# Patient Record
Sex: MALE | Race: WHITE | Employment: FULL TIME | URBAN - NONMETROPOLITAN AREA
[De-identification: names, ages, dates, MRNs, and addresses within clinical notes are randomized per-mention and may not be internally consistent; named-entity substitution may affect disease eponyms.]

---

## 2020-01-15 ENCOUNTER — HOSPITAL ENCOUNTER (EMERGENCY)
Age: 55
Discharge: HOME | End: 2020-01-15
Payer: COMMERCIAL

## 2020-01-15 VITALS
BODY MASS INDEX: 38.51 KG/M2 | DIASTOLIC BLOOD PRESSURE: 92 MMHG | RESPIRATION RATE: 18 BRPM | SYSTOLIC BLOOD PRESSURE: 133 MMHG | WEIGHT: 260 LBS | TEMPERATURE: 98.2 F | HEART RATE: 88 BPM | HEIGHT: 69 IN | OXYGEN SATURATION: 96 %

## 2020-01-15 PROCEDURE — 99213 OFFICE O/P EST LOW 20 MIN: CPT | Performed by: NURSE PRACTITIONER

## 2020-01-15 PROCEDURE — 99202 OFFICE O/P NEW SF 15 MIN: CPT

## 2020-01-15 RX ORDER — COVID-19 ANTIGEN TEST
1 KIT MISCELLANEOUS
COMMUNITY
End: 2021-09-20

## 2020-01-15 RX ORDER — PREDNISONE 50 MG/1
50 TABLET ORAL DAILY
Qty: 5 TABLET | Refills: 0 | Status: SHIPPED | OUTPATIENT
Start: 2020-01-15 | End: 2020-01-20

## 2020-01-15 ASSESSMENT — PAIN DESCRIPTION - ONSET: ONSET: GRADUAL

## 2020-01-15 ASSESSMENT — ENCOUNTER SYMPTOMS
SHORTNESS OF BREATH: 0
NAUSEA: 0
VOMITING: 0

## 2020-01-15 ASSESSMENT — PAIN SCALES - GENERAL: PAINLEVEL_OUTOF10: 9

## 2020-01-15 ASSESSMENT — PAIN - FUNCTIONAL ASSESSMENT: PAIN_FUNCTIONAL_ASSESSMENT: PREVENTS OR INTERFERES WITH ALL ACTIVE AND SOME PASSIVE ACTIVITIES

## 2020-01-15 ASSESSMENT — PAIN DESCRIPTION - ORIENTATION: ORIENTATION: RIGHT

## 2020-01-15 ASSESSMENT — PAIN DESCRIPTION - FREQUENCY: FREQUENCY: INTERMITTENT

## 2020-01-15 ASSESSMENT — PAIN DESCRIPTION - LOCATION: LOCATION: FOOT

## 2020-01-15 ASSESSMENT — PAIN DESCRIPTION - PROGRESSION: CLINICAL_PROGRESSION: RAPIDLY WORSENING

## 2020-01-15 ASSESSMENT — PAIN DESCRIPTION - DESCRIPTORS: DESCRIPTORS: THROBBING

## 2020-01-15 NOTE — ED PROVIDER NOTES
[address]  Urgent Care Encounter       CHIEF COMPLAINT       Chief Complaint   Patient presents with    Foot Pain     right foot x 2 days        Nurses Notes reviewed and I agree except as noted in the HPI. HISTORY OF PRESENT ILLNESS   Donya Rivero is a 48 y.o. male who presents with complaints of pain, redness and swelling to the right great toe, onset 2 days ago. Patient states pain is become progressively worse since onset. Patient denies a history of gout but has a history of kidney stones and an elevated uric acid level approximately 6 years ago. He denies alcohol use. No fevers, chills, nausea or vomiting. The history is provided by the patient. REVIEW OF SYSTEMS     Review of Systems   Constitutional: Negative for chills and fever. Respiratory: Negative for shortness of breath. Cardiovascular: Negative for chest pain. Gastrointestinal: Negative for nausea and vomiting. Musculoskeletal:        Right great toe swelling, redness and pain   Neurological: Negative for headaches. PAST MEDICAL HISTORY         Diagnosis Date    History of kidney stones 2014    Dr Rogelio Harris [facility]     Kidney stone        SURGICALHISTORY     Patient  has a past surgical history that includes Colonoscopy (4/12); Tonsillectomy (as a child ); Arm Surgery (Left, as a child ); cyst removal (Left, 12/16/2013); and Skin cancer excision (6-2014). CURRENT MEDICATIONS       Previous Medications    IBUPROFEN (ADVIL;MOTRIN) 200 MG TABLET    Take 200 mg by mouth every 6 hours as needed for Pain. NAPROXEN SODIUM (ALEVE) 220 MG CAPS    Take 1 tablet by mouth       ALLERGIES     Patient is has No Known Allergies. Patients   There is no immunization history on file for this patient. FAMILY HISTORY     Patient's family history includes Heart Disease in his father; Heart Surgery in his father; Hypertension in his father and mother.     SOCIAL HISTORY     Patient  reports that he has never smoked. He has never used smokeless tobacco. He reports that he does not drink alcohol or use drugs. PHYSICAL EXAM     ED TRIAGE VITALS  BP: (!) 133/92, Temp: 98.2 °F (36.8 °C), Pulse: 88, Resp: 18, SpO2: 96 %,Estimated body mass index is 38.4 kg/m² as calculated from the following:    Height as of this encounter: 5' 9\" (1.753 m). Weight as of this encounter: 260 lb (117.9 kg). ,No LMP for male patient. Physical Exam  Vitals signs and nursing note reviewed. Constitutional:       General: He is not in acute distress. Appearance: He is well-developed. HENT:      Head: Normocephalic and atraumatic. Pulmonary:      Effort: Pulmonary effort is normal. No respiratory distress. Musculoskeletal:      Right foot: Decreased range of motion. Tenderness and swelling present. Feet:       Comments: Tenderness, swelling, redness and warmth noted to the first MTP joint on the right foot. Skin:     General: Skin is warm and dry. Neurological:      Mental Status: He is alert and oriented to person, place, and time. Psychiatric:         Speech: Speech normal.         Behavior: Behavior normal. Behavior is cooperative. DIAGNOSTIC RESULTS     Labs:No results found for this visit on 01/15/20. IMAGING:    No orders to display         EKG:      URGENT CARE COURSE:     Vitals:    01/15/20 1228   BP: (!) 133/92   Pulse: 88   Resp: 18   Temp: 98.2 °F (36.8 °C)   TempSrc: Temporal   SpO2: 96%   Weight: 260 lb (117.9 kg)   Height: 5' 9\" (1.753 m)       Medications - No data to display         PROCEDURES:  None    FINAL IMPRESSION      1. Acute idiopathic gout involving toe of right foot          DISPOSITION/ PLAN     Patient presents with acute gout of the first MTP joint on the right foot. Treat with prednisone burst.  Ice and limited activity recommended. Follow-up with family doctor or podiatrist if not improved with treatment.   Further instructions were outlined verbally and in the patient's discharge instructions. All the patient's questions were answered. The patient/parent agreed with the plan and was discharged from the [facility] in good condition. PATIENT REFERRED TO:  Lay Ornelas MD  [address] [address]      DISCHARGE MEDICATIONS:  New Prescriptions    PREDNISONE (DELTASONE) 50 MG TABLET    Take 1 tablet by mouth daily for 5 days       Discontinued Medications    GEMFIBROZIL (LOPID) 600 MG TABLET    Take 1 tablet by mouth 2 times daily for 30 days. HYDROCODONE-ACETAMINOPHEN (NORCO) 5-325 MG PER TABLET    Take 1 tablet by mouth every 6 hours as needed for Pain. ONDANSETRON (ZOFRAN ODT) 4 MG DISINTEGRATING TABLET    Take 1 tablet by mouth every 8 hours as needed for Nausea. TAMSULOSIN (FLOMAX) 0.4 MG CAPSULE    Take 1 capsule by mouth daily for 5 doses.        Current Discharge Medication List          CALEB Arreola CNP    (Please note that portions of this note were completed with a voice recognition program. Efforts were made to edit the dictations but occasionally words are mis-transcribed.)         CALEB Arreola CNP  01/15/20 7863

## 2020-01-17 ENCOUNTER — OFFICE VISIT (OUTPATIENT)
Dept: FAMILY MEDICINE CLINIC | Age: 55
End: 2020-01-17
Payer: COMMERCIAL

## 2020-01-17 VITALS
SYSTOLIC BLOOD PRESSURE: 166 MMHG | BODY MASS INDEX: 42.59 KG/M2 | HEART RATE: 96 BPM | DIASTOLIC BLOOD PRESSURE: 88 MMHG | WEIGHT: 288.38 LBS | RESPIRATION RATE: 20 BRPM | TEMPERATURE: 97.3 F

## 2020-01-17 PROCEDURE — G8484 FLU IMMUNIZE NO ADMIN: HCPCS | Performed by: NURSE PRACTITIONER

## 2020-01-17 PROCEDURE — 1036F TOBACCO NON-USER: CPT | Performed by: NURSE PRACTITIONER

## 2020-01-17 PROCEDURE — G8427 DOCREV CUR MEDS BY ELIG CLIN: HCPCS | Performed by: NURSE PRACTITIONER

## 2020-01-17 PROCEDURE — 99203 OFFICE O/P NEW LOW 30 MIN: CPT | Performed by: NURSE PRACTITIONER

## 2020-01-17 PROCEDURE — G8417 CALC BMI ABV UP PARAM F/U: HCPCS | Performed by: NURSE PRACTITIONER

## 2020-01-17 PROCEDURE — 3017F COLORECTAL CA SCREEN DOC REV: CPT | Performed by: NURSE PRACTITIONER

## 2020-01-17 ASSESSMENT — ENCOUNTER SYMPTOMS
VOMITING: 0
EYE PAIN: 0
BLOOD IN STOOL: 0
NAUSEA: 0
ABDOMINAL PAIN: 0
COUGH: 0
DIARRHEA: 0
SHORTNESS OF BREATH: 0
WHEEZING: 0
COLOR CHANGE: 0
BACK PAIN: 0

## 2020-01-17 NOTE — PATIENT INSTRUCTIONS
Patient Education        Gout: Care Instructions  Your Care Instructions    Gout is a form of arthritis caused by a buildup of uric acid crystals in a joint. It causes sudden attacks of pain, swelling, redness, and stiffness, usually in one joint, especially the big toe. Gout usually comes on without a cause. But it can be brought on by drinking alcohol (especially beer) or eating seafood and red meat. Taking certain medicines, such as diuretics or aspirin, also can bring on an attack of gout. Taking your medicines as prescribed and following up with your doctor regularly can help you avoid gout attacks in the future. Follow-up care is a key part of your treatment and safety. Be sure to make and go to all appointments, and call your doctor if you are having problems. It's also a good idea to know your test results and keep a list of the medicines you take. How can you care for yourself at home? · If the joint is swollen, put ice or a cold pack on the area for 10 to 20 minutes at a time. Put a thin cloth between the ice and your skin. · Prop up the sore limb on a pillow when you ice it or anytime you sit or lie down during the next 3 days. Try to keep it above the level of your heart. This will help reduce swelling. · Rest sore joints. Avoid activities that put weight or strain on the joints for a few days. Take short rest breaks from your regular activities during the day. · Take your medicines exactly as prescribed. Call your doctor if you think you are having a problem with your medicine. · Take pain medicines exactly as directed. ? If the doctor gave you a prescription medicine for pain, take it as prescribed. ? If you are not taking a prescription pain medicine, ask your doctor if you can take an over-the-counter medicine. · Eat less seafood and red meat. · Check with your doctor before drinking alcohol. · Losing weight, if you are overweight, may help reduce attacks of gout.  But do not go on a \"crash diet. \" Losing a lot of weight in a short amount of time can cause a gout attack. When should you call for help? Call your doctor now or seek immediate medical care if:    · You have a fever.     · The joint is so painful you cannot use it.     · You have sudden, unexplained swelling, redness, warmth, or severe pain in one or more joints.    Watch closely for changes in your health, and be sure to contact your doctor if:    · You have joint pain.     · Your symptoms get worse or are not improving after 2 or 3 days. Where can you learn more? Go to https://[email or web address].[email or web address] IQeweb.[email or web address] org and sign in to your Mastodon C account. Enter E851 in the SurIDx box to learn more about \"Gout: Care Instructions. \"     If you do not have an account, please click on the \"Sign Up Now\" link. Current as of: April 1, 2019  Content Version: 12.3  © 5625-5756 [employer]. Care instructions adapted under license by [facility] ([facility]). If you have questions about a medical condition or this instruction, always ask your healthcare professional. [place] [address] any warranty or liability for your use of this information. Patient Education        Cholesterol and Triglycerides Tests: About These Tests  What are they? Cholesterol and triglycerides tests measure the amount of fats in your blood. These fats have both \"good\" (HDL) and \"bad\" (LDL) cholesterol. Why are these tests done? These tests are done to help find out your risk of a heart attack and stroke. They can help your doctor find out how well medicine is working for some health problems. How can you prepare for these tests? · Your doctor may tell you to fast before your tests. This means that you do not eat or drink anything except water for 9 to 12 hours before the tests. In most cases, you can take your medicines with water the morning of the test.  · Do not eat high-fat foods the night before the tests.   · Do not drink alcohol or do intense exercise the night before the tests. · Be sure to tell your doctor about all the over-the-counter and prescription medicines and herbs or other supplements you take. They can affect the results of these tests. What happens during these tests? A health professional takes a sample of your blood. What else should you know about these tests? Your cholesterol levels can help your doctor find out your risk for having a heart attack or stroke. But it's not just about your cholesterol. Your doctor uses your cholesterol levels plus other things to calculate your risk. These include:  · Your blood pressure. · Whether or not you have diabetes. · Your age, sex, and race. · Whether or not you smoke. You and your doctor can talk about whether you need to lower your risk and what treatment is best for you. Where can you learn more? Go to https://Coterapeperlaeb.ClearDATA. org and sign in to your Quack account. Enter K857 in the Clarivoy box to learn more about \"Cholesterol and Triglycerides Tests: About These Tests. \"     If you do not have an account, please click on the \"Sign Up Now\" link. Current as of: April 9, 2019  Content Version: 12.3  © 8656-5614 [employer]. Care instructions adapted under license by [facility] ([facility]). If you have questions about a medical condition or this instruction, always ask your healthcare professional. [address] any warranty or liability for your use of this information.

## 2020-01-17 NOTE — PROGRESS NOTES
eye: No discharge. Left eye: No discharge. Conjunctiva/sclera: Conjunctivae normal.   Neck:      Musculoskeletal: Full passive range of motion without pain and neck supple. Vascular: No carotid bruit. Trachea: No tracheal deviation. Cardiovascular:      Rate and Rhythm: Normal rate and regular rhythm. Heart sounds: Normal heart sounds. No murmur. Pulmonary:      Effort: Pulmonary effort is normal. No respiratory distress. Breath sounds: Normal breath sounds. Abdominal:      General: Bowel sounds are normal.      Palpations: Abdomen is soft. Tenderness: There is no tenderness. Musculoskeletal:      Right foot: Decreased range of motion. Tenderness and swelling present. Feet:    Feet:      Right foot:      Skin integrity: Erythema and warmth present. Toenail Condition: Right toenails are normal.   Lymphadenopathy:      Head:      Right side of head: No submental, submandibular, tonsillar, preauricular, posterior auricular or occipital adenopathy. Left side of head: No submental, submandibular, tonsillar, preauricular, posterior auricular or occipital adenopathy. Cervical: No cervical adenopathy. Skin:     General: Skin is warm and dry. Findings: No rash. Neurological:      General: No focal deficit present. Mental Status: He is alert and oriented to person, place, and time. Coordination: Coordination normal.   Psychiatric:         Behavior: Behavior normal.         Thought Content: Thought content normal.         Judgment: Judgment normal.         ASSESSMENT/PLAN:  1. Lipid screening  - Lipid Panel; Future    2. IFG (impaired fasting glucose)  - Hemoglobin A1C; Future  - Comprehensive Metabolic Panel; Future  - TSH without Reflex; Future  - T4, Free; Future    3. Acute gout of right foot, unspecified cause  - CBC Auto Differential; Future  - Comprehensive Metabolic Panel; Future    4.  Screening PSA (prostate specific antigen)  - Psa

## 2020-01-23 LAB
ABSOLUTE BASO #: 0.1 X10E9/L (ref 0–0.9)
ABSOLUTE EOS #: 0.2 X10E9/L (ref 0–0.4)
ABSOLUTE LYMPH #: 2.2 X10E9/L (ref 1–3.5)
ABSOLUTE MONO #: 0.8 X10E9/L (ref 0–0.9)
ABSOLUTE NEUT #: 6.9 X10E9/L (ref 1.5–6.6)
ALBUMIN SERPL-MCNC: 4.3 G/DL (ref 3.2–5.3)
ALK PHOSPHATASE: 110 U/L (ref 39–130)
ALT SERPL-CCNC: 15 U/L (ref 0–40)
ANION GAP SERPL CALCULATED.3IONS-SCNC: 12 MMOL/L (ref 4–12)
AST SERPL-CCNC: 16 U/L (ref 0–41)
AVERAGE GLUCOSE: 120
AVERAGE GLUCOSE: 120 MG/DL
BASOPHILS RELATIVE PERCENT: 0.7 %
BILIRUB SERPL-MCNC: 0.7 MG/DL (ref 0.3–1.2)
BUN BLDV-MCNC: 13 MG/DL (ref 5–23)
CALCIUM SERPL-MCNC: 9.2 MG/DL (ref 8.5–10.5)
CHLORIDE BLD-SCNC: 104 MMOL/L (ref 98–109)
CHOLESTEROL/HDL RATIO: 4.1 (ref 1–5)
CHOLESTEROL: 146 MG/DL (ref 150–200)
CO2: 27 MMOL/L (ref 22–32)
CREAT SERPL-MCNC: 1.08 MG/DL (ref 0.6–1.3)
EGFR AFRICAN AMERICAN: >60 ML/MIN/1.73SQ.M
EGFR IF NONAFRICAN AMERICAN: >60 ML/MIN/1.73SQ.M
EOSINOPHILS RELATIVE PERCENT: 1.7 %
GLUCOSE: 97 MG/DL (ref 65–99)
HBA1C MFR BLD: 5.8 %
HBA1C MFR BLD: 5.8 % (ref 4.4–6.4)
HCT VFR BLD CALC: 48.4 % (ref 39–49)
HDLC SERPL-MCNC: 36 MG/DL
HEMOGLOBIN: 16.2 G/DL (ref 13.1–17.3)
LDL CHOLESTEROL CALCULATED: 78 MG/DL
LDL CHOLESTEROL DIRECT: 78 MG/DL
LDL/HDL RATIO: 2.2
LYMPHOCYTE %: 21.7 %
MCH RBC QN AUTO: 29.2 PG (ref 27–35)
MCHC RBC AUTO-ENTMCNC: 33.4 G/DL (ref 32–36)
MCV RBC AUTO: 88 FL (ref 81–101)
MONOCYTES # BLD: 8.2 %
NEUTROPHILS RELATIVE PERCENT: 67.7 %
PDW BLD-RTO: 13.6 % (ref 11.4–14.3)
PLATELETS: 280 X10E9/L (ref 150–450)
PMV BLD AUTO: 9.6 FL (ref 7–12)
POTASSIUM SERPL-SCNC: 3.6 MMOL/L (ref 3.5–5)
PSA, ULTRASENSITIVE: 0.32 NG/ML (ref 0–4)
RBC: 5.53 X10E12/L (ref 4.25–5.65)
SODIUM BLD-SCNC: 143 MMOL/L (ref 134–146)
T4 FREE: 1.09 NG/DL (ref 0.61–1.6)
TOTAL PROTEIN: 6.8 G/DL (ref 6–8)
TRIGL SERPL-MCNC: 160 MG/DL (ref 27–150)
TSH SERPL DL<=0.05 MIU/L-ACNC: 2.44 UIU/ML (ref 0.49–4.67)
VLDLC SERPL CALC-MCNC: 32 MG/DL (ref 0–30)
WBC: 10.2 X10E9/L (ref 4.8–10.8)

## 2020-02-14 ENCOUNTER — OFFICE VISIT (OUTPATIENT)
Dept: FAMILY MEDICINE CLINIC | Age: 55
End: 2020-02-14
Payer: COMMERCIAL

## 2020-02-14 VITALS
SYSTOLIC BLOOD PRESSURE: 136 MMHG | DIASTOLIC BLOOD PRESSURE: 86 MMHG | WEIGHT: 287 LBS | RESPIRATION RATE: 16 BRPM | HEART RATE: 80 BPM | BODY MASS INDEX: 42.38 KG/M2

## 2020-02-14 PROBLEM — Z87.39 H/O: GOUT: Status: ACTIVE | Noted: 2020-02-14

## 2020-02-14 PROCEDURE — G8484 FLU IMMUNIZE NO ADMIN: HCPCS | Performed by: NURSE PRACTITIONER

## 2020-02-14 PROCEDURE — 99396 PREV VISIT EST AGE 40-64: CPT | Performed by: NURSE PRACTITIONER

## 2020-02-14 RX ORDER — COLCHICINE 0.6 MG/1
TABLET, FILM COATED ORAL
COMMUNITY
Start: 2020-01-22 | End: 2021-09-20 | Stop reason: ALTCHOICE

## 2020-02-14 ASSESSMENT — ENCOUNTER SYMPTOMS
COLOR CHANGE: 0
COUGH: 0
SINUS PAIN: 0
TROUBLE SWALLOWING: 0
VOMITING: 0
EYE PAIN: 0
SHORTNESS OF BREATH: 0
ABDOMINAL PAIN: 0
BACK PAIN: 0
SORE THROAT: 0
WHEEZING: 0
FACIAL SWELLING: 0
BLOOD IN STOOL: 0
NAUSEA: 0
DIARRHEA: 0

## 2020-02-14 NOTE — PROGRESS NOTES
[address] [facility] [address]  Dept: [phone]  Dept Fax: [phone]  Loc: [phone]     2020     Corin Martinez (:  1965) is a 48 y.o. male, here for evaluation of the following medical concerns:    Chief Complaint   Patient presents with    Follow-up   [address]     labs in lab tab        HPI  Pt presents to the office today for annual exam.  He denies any chronic health problems. He was seen a few weeks ago for acute gout attack and was sent to the podiatrist, he had an injection and was given oral colcrys with full relief of symptoms. He has a follow up with podiatry next week. Pt is active, his BMI is 42.38. He was told he had high cholesterol in the past, but has not had them done in a few years. He is a non smoker. Colonoscopy done a few years ago and he was given a 10 year window at that time.       Component      Latest Ref Rng & Units 2020   WBC      4.8 - 10.8 X10E9/L 10.2   RBC      4.25 - 5.65 X10E12/L 5.53   Hemoglobin Quant      13.1 - 17.3 g/dL 16.2   Hematocrit      39 - 49 % 48.4   MCV      81 - 101 fL 88   MCH      27 - 35 pg 29.2   MCHC      32 - 36 g/dL 33.4   RDW      11.4 - 14.3 % 13.6   Platelet Count      499 - 450 X10E9/L 280   MPV      7 - 12 fL 9.6   Neutrophils %      % 67.7   Absolute Neut #      1.5 - 6.6 X10E9/L 6.9 (H)   Lymphocyte %      % 21.7   Absolute Lymph #      1.0 - 3.5 X10E9/L 2.2   Monocytes      % 8.2   Absolute Mono #      0 - 0.9 X10E9/L 0.8   Eosinophils %      % 1.7   Absolute Eos #      0.0 - 0.4 X10E9/L 0.2   Basophils %      % 0.7   Basophils Absolute      0 - 0.9 X10E9/L 0.1   Glucose      65 - 99 mg/dL 97   BUN      5 - 23 mg/dL 13   Creatinine      0.60 - 1.30 mg/dL 1.08   eGFR African American      >59 ml/min/1.73sq.m >60   EGFR IF NonAfrican American      >59 ml/min/1.73sq.m >60   Calcium      8.5 - 10.5 mg/dL 9.2   Sodium      134 - 146 Bowel sounds are normal.      Palpations: Abdomen is soft. Tenderness: There is no abdominal tenderness. Musculoskeletal: Normal range of motion. Lymphadenopathy:      Head:      Right side of head: No submental, submandibular, tonsillar, preauricular, posterior auricular or occipital adenopathy. Left side of head: No submental, submandibular, tonsillar, preauricular, posterior auricular or occipital adenopathy. Cervical: No cervical adenopathy. Skin:     General: Skin is warm and dry. Findings: No rash. Neurological:      General: No focal deficit present. Mental Status: He is alert and oriented to person, place, and time. Coordination: Coordination normal.   Psychiatric:         Mood and Affect: Mood normal.         Behavior: Behavior normal.         Thought Content: Thought content normal.         Judgment: Judgment normal.         ASSESSMENT/PLAN:  1. Annual physical exam    2. Lipid screening  - Lipid Panel; Future    3. Screening PSA (prostate specific antigen)  - Psa screening; Future    4. Screening for diabetes mellitus (DM)  - Glucose, Fasting; Future    5. H/O: gout    - UTD on Colonoscopy- will track down report from Dr Tristan Petersen and review follow up   - Repeat labs in 1 year as ordered  - Work on diet and exercise for optimal cardiovascular health and to help with BP.  - Call office with any questions or concerns, or if symptoms are getting worse or changing      Return in about 1 year (around 2/14/2021) for Wellness/Physical.    Patient given educational materials - see patient instructions. Discussed use, benefit, and side effects of prescribed medications. All patient questions answered. Pt voiced understanding. Reviewed health maintenance. An electronic signature was used to authenticate this note.     --CALEB Porras - CNP on 2/14/2020 at 8:37 AM

## 2020-11-12 ENCOUNTER — TELEPHONE (OUTPATIENT)
Dept: FAMILY MEDICINE CLINIC | Age: 55
End: 2020-11-12

## 2020-11-12 ENCOUNTER — HOSPITAL ENCOUNTER (OUTPATIENT)
Age: 55
Setting detail: SPECIMEN
Discharge: HOME | End: 2020-11-12
Payer: COMMERCIAL

## 2020-11-12 PROCEDURE — U0003 INFECTIOUS AGENT DETECTION BY NUCLEIC ACID (DNA OR RNA); SEVERE ACUTE RESPIRATORY SYNDROME CORONAVIRUS 2 (SARS-COV-2) (CORONAVIRUS DISEASE [COVID-19]), AMPLIFIED PROBE TECHNIQUE, MAKING USE OF HIGH THROUGHPUT TECHNOLOGIES AS DESCRIBED BY CMS-2020-01-R: HCPCS

## 2020-11-12 NOTE — TELEPHONE ENCOUNTER
Co-worker exposed and tested positive, he needs a test done for work. Had symptoms last week, but none for 48 hours.

## 2020-11-13 LAB — SARS-COV-2: DETECTED

## 2020-11-17 ENCOUNTER — TELEPHONE (OUTPATIENT)
Dept: FAMILY MEDICINE CLINIC | Age: 55
End: 2020-11-17

## 2020-11-17 RX ORDER — DEXTROMETHORPHAN HYDROBROMIDE AND PROMETHAZINE HYDROCHLORIDE 15; 6.25 MG/5ML; MG/5ML
5 SYRUP ORAL 4 TIMES DAILY PRN
Qty: 120 ML | Refills: 0 | Status: SHIPPED | OUTPATIENT
Start: 2020-11-17 | End: 2020-11-20 | Stop reason: SDUPTHER

## 2020-11-17 NOTE — TELEPHONE ENCOUNTER
Wife calling on behalf of patient. States that he is having continued issues with his cough due to COVID. Can hardly speak without having \"coughing fit\". Has tried Robitussin, Nyquil, and Mucinex with no benefit. No wheezing. Wife is requesting a steroid if appropriate or prescription cough medication. NKDA, pharmacy info entered.

## 2020-11-20 ENCOUNTER — TELEPHONE (OUTPATIENT)
Dept: FAMILY MEDICINE CLINIC | Age: 55
End: 2020-11-20

## 2020-11-20 RX ORDER — DEXTROMETHORPHAN HYDROBROMIDE AND PROMETHAZINE HYDROCHLORIDE 15; 6.25 MG/5ML; MG/5ML
5 SYRUP ORAL 4 TIMES DAILY PRN
Qty: 120 ML | Refills: 0 | Status: SHIPPED | OUTPATIENT
Start: 2020-11-20 | End: 2021-09-20 | Stop reason: ALTCHOICE

## 2020-11-20 NOTE — TELEPHONE ENCOUNTER
Patient's wife, Isabelle Rivera, calling to see if you would consider giving patient ATB or steroid at this time. He is COVID positive and was recently given rx for promethazine for his cough. This has been beneficial but wife wants to know if there is something more you could do as it just seems like he isn't getting better. Denies acute SOB, no wheezing, etc. Just has a lot of nasal congestion (using Sudafed with some benefit) and coughing spells when eating, drinking, talking. Very fatigued. Please advise. Pharmacy info entered.

## 2020-11-20 NOTE — TELEPHONE ENCOUNTER
The most recent study document no role for/ benefit from Steroids unless pt requires O2 at this time. The other consideration would be if pt actively wheezing at this time. We also don't like to start Steroids if we don't have to due to the immunosuppressing nature of steroids. There is also no role for Zithromax in treatment of COVID unless pt has secondary bacterial infection. OK for VV with TS/ WS today to discuss symptoms/ need for further treatement.   ES

## 2021-09-08 ENCOUNTER — TELEPHONE (OUTPATIENT)
Dept: FAMILY MEDICINE CLINIC | Age: 56
End: 2021-09-08

## 2021-09-08 DIAGNOSIS — Z12.5 ENCOUNTER FOR SCREENING FOR MALIGNANT NEOPLASM OF PROSTATE: ICD-10-CM

## 2021-09-08 DIAGNOSIS — Z00.00 LABORATORY EXAMINATION ORDERED AS PART OF A ROUTINE GENERAL MEDICAL EXAMINATION: Primary | ICD-10-CM

## 2021-09-08 DIAGNOSIS — R73.01 IFG (IMPAIRED FASTING GLUCOSE): ICD-10-CM

## 2021-09-08 DIAGNOSIS — Z87.39 H/O: GOUT: ICD-10-CM

## 2021-09-08 DIAGNOSIS — E78.5 HYPERLIPIDEMIA, UNSPECIFIED HYPERLIPIDEMIA TYPE: ICD-10-CM

## 2021-09-08 NOTE — TELEPHONE ENCOUNTER
Check FLP, CMP, free T4/TSH, CBC, uric acid, and PSA at this time diagnoses: Labs associated with general medical exam, gout, low HDL, screening PSA, screening for diabetes.   ES

## 2021-09-10 LAB
ABSOLUTE BASO #: 0.1 X10E9/L (ref 0–0.2)
ABSOLUTE EOS #: 0.1 X10E9/L (ref 0–0.4)
ABSOLUTE LYMPH #: 1.8 X10E9/L (ref 1–3.5)
ABSOLUTE MONO #: 0.8 X10E9/L (ref 0–0.9)
ABSOLUTE NEUT #: 5.8 X10E9/L (ref 1.5–6.6)
ALBUMIN SERPL-MCNC: 4.2 G/DL (ref 3.2–5.3)
ALK PHOSPHATASE: 107 U/L (ref 39–130)
ALT SERPL-CCNC: 17 U/L (ref 0–40)
ANION GAP SERPL CALCULATED.3IONS-SCNC: 9 MMOL/L (ref 5–15)
AST SERPL-CCNC: 18 U/L (ref 0–41)
BASOPHILS RELATIVE PERCENT: 0.7 %
BILIRUB SERPL-MCNC: 0.8 MG/DL (ref 0.3–1.2)
BUN BLDV-MCNC: 9 MG/DL (ref 5–23)
CALCIUM SERPL-MCNC: 9.2 MG/DL (ref 8.5–10.5)
CHLORIDE BLD-SCNC: 105 MMOL/L (ref 98–109)
CHOLESTEROL/HDL RATIO: 4.3 (ref 1–5)
CHOLESTEROL: 145 MG/DL (ref 150–200)
CO2: 28 MMOL/L (ref 22–32)
CREAT SERPL-MCNC: 1.14 MG/DL (ref 0.6–1.3)
EGFR AFRICAN AMERICAN: >60 ML/MIN/1.73SQ.M
EGFR IF NONAFRICAN AMERICAN: >60 ML/MIN/1.73SQ.M
EOSINOPHILS RELATIVE PERCENT: 1.1 %
GLUCOSE: 97 MG/DL (ref 65–99)
HCT VFR BLD CALC: 45.5 % (ref 39–49)
HDLC SERPL-MCNC: 34 MG/DL
HEMOGLOBIN: 15.2 G/DL (ref 13–17)
LDL CHOLESTEROL CALCULATED: 71 MG/DL
LDL/HDL RATIO: 2.1
LYMPHOCYTE %: 21.4 %
MCH RBC QN AUTO: 28.7 PG (ref 27–34)
MCHC RBC AUTO-ENTMCNC: 33.4 G/DL (ref 32–36)
MCV RBC AUTO: 86 FL (ref 80–100)
MONOCYTES # BLD: 8.9 %
NEUTROPHILS RELATIVE PERCENT: 67.9 %
PDW BLD-RTO: 13.3 % (ref 11.5–15)
PLATELETS: 269 X10E9/L (ref 150–450)
PMV BLD AUTO: 10.1 FL (ref 7–12)
POTASSIUM SERPL-SCNC: 3.8 MMOL/L (ref 3.5–5)
PSA, ULTRASENSITIVE: 0.31 NG/ML (ref 0–4)
RBC: 5.3 X10E12/L (ref 4.1–5.7)
SODIUM BLD-SCNC: 142 MMOL/L (ref 134–146)
T4 FREE: 0.73 NG/DL (ref 0.61–1.6)
TOTAL PROTEIN: 6.9 G/DL (ref 6–8)
TRIGL SERPL-MCNC: 199 MG/DL (ref 27–150)
TSH SERPL DL<=0.05 MIU/L-ACNC: 1.43 UIU/ML (ref 0.49–4.67)
URIC ACID: 8.6 MG/DL (ref 2.6–7.2)
VLDLC SERPL CALC-MCNC: 40 MG/DL (ref 0–30)
WBC: 8.6 X10E9/L (ref 4–11)

## 2021-09-18 NOTE — PROGRESS NOTES
[facility]  [facility]  Dept: [phone]  Dept Fax: [phone]  JOSETTE Gibbons is a 49 y.o.male    Pt presents for annual wellness physical exam.     Patient also presents for follow-up of impaired fasting glucose, gout, and hyperlipidemia    Both parents now admitted to [address] [address] home on a full time basis. Pot doing ok at this time- denies any new complaints. Wt Readings from Last 3 Encounters:   09/20/21 294 lb 3.2 oz (133.4 kg)   02/14/20 287 lb (130.2 kg)   01/17/20 288 lb 6 oz (130.8 kg)   Weight increased 7# since last visit 19 months ago. Pt golfing on a regular basis. Pt admits to dietary indiscretion, though \"I try to watch portion control\"     Pt stable since last visit- no new problems for diagnoses listed below:  Patient Active Problem List   Diagnosis    Hyperlipidemia    IFG (impaired fasting glucose)    H/O: gout       Review of Systems   Constitutional: Negative for chills, diaphoresis, fatigue, fever and unexpected weight change. Eyes: Negative for visual disturbance. Respiratory: Negative for chest tightness and shortness of breath. Cardiovascular: Negative for chest pain, palpitations and leg swelling. Gastrointestinal: Negative for abdominal pain, anal bleeding, blood in stool, constipation, diarrhea, nausea and vomiting. Genitourinary: Negative for dysuria and hematuria. Musculoskeletal: Negative for neck pain. Neurological: Negative for dizziness, light-headedness and headaches. OBJECTIVE     BP (!) 144/96 (Site: Left Upper Arm, Position: Sitting)   Pulse 84   Temp 98.1 °F (36.7 °C) (Oral)   Resp 16   Ht 5' 10\" (1.778 m)   Wt 294 lb 3.2 oz (133.4 kg)   BMI 42.21 kg/m²     Wt Readings from Last 3 Encounters:   09/20/21 294 lb 3.2 oz (133.4 kg)   02/14/20 287 lb (130.2 kg)   01/17/20 288 lb 6 oz (130.8 kg)     Physical Exam  Vitals and nursing note reviewed.    Constitutional: Appearance: He is well-developed. HENT:      Head: Normocephalic and atraumatic. Right Ear: External ear normal.      Left Ear: External ear normal.      Nose: Nose normal.   Eyes:      Conjunctiva/sclera: Conjunctivae normal.      Pupils: Pupils are equal, round, and reactive to light. Cardiovascular:      Rate and Rhythm: Normal rate and regular rhythm. Pulmonary:      Effort: Pulmonary effort is normal.      Breath sounds: Normal breath sounds. Abdominal:      General: Bowel sounds are normal.      Palpations: Abdomen is soft. Musculoskeletal:         General: Normal range of motion. Cervical back: Normal range of motion and neck supple. Skin:     General: Skin is warm and dry. Neurological:      Mental Status: He is alert and oriented to person, place, and time. Deep Tendon Reflexes: Reflexes are normal and symmetric. Psychiatric:         Behavior: Behavior normal.         Thought Content: Thought content normal.         Judgment: Judgment normal.         Component      Latest Ref Rng & Units 9/9/2021   WBC      4.0 - 11.0 X10E9/L 8.6   RBC      4.10 - 5.70 X10E12/L 5.30   Hemoglobin Quant      13.0 - 17.0 g/dL 15.2   Hematocrit      39 - 49 % 45.5   MCV      80 - 100 fL 86   MCH      27 - 34 pg 28.7   MCHC      32 - 36 g/dL 33.4   RDW      11.5 - 15.0 % 13.3   Platelet Count      926 - 450 X10E9/L 269   MPV      7 - 12 fL 10.1   Neutrophils %      % 67.9   Absolute Neut #      1.5 - 6.6 X10E9/L 5.8   Lymphocyte %      % 21.4   Absolute Lymph #      1.0 - 3.5 X10E9/L 1.8   Monocytes      % 8.9   Absolute Mono #      0 - 0.9 X10E9/L 0.8   Eosinophils %      % 1.1   Absolute Eos #      0.0 - 0.4 X10E9/L 0.1   Basophils %      % 0.7   Basophils Absolute      0.0 - 0.2 X10E9/L 0.1   Glucose      65 - 99 mg/dL 97   BUN      5 - 23 mg/dL 9   Creatinine      0.60 - 1.30 mg/dL 1.14   eGFR African American      >59 ml/min/1.73sq.m >60   EGFR IF NonAfrican American      >59 ml/min/1.73sq. m >60   Calcium      8.5 - 10.5 mg/dL 9.2   Sodium      134 - 146 mmol/L 142   Potassium      3.5 - 5.0 mmol/L 3.8   Chloride      98 - 109 mmol/L 105   CO2      22 - 32 mmol/L 28   Anion Gap      5 - 15 mmol/L 9   Bilirubin      0.3 - 1.2 mg/dL 0.8   Alk Phosphatase      39 - 130 U/L 107   AST      0 - 41 U/L 18   ALT      0 - 40 U/L 17   Total Protein      6.0 - 8.0 g/dL 6.9   Albumin      3.2 - 5.3 g/dL 4.2   Cholesterol      150 - 200 mg/dL 145 (L)   Triglycerides      27 - 150 mg/dL 199 (H)   HDL Cholesterol      >39 mg/dL 34 (L)   LDL Calculated      <130 mg/dL 71   VLDL Cholesterol Calculated      0 - 30 mg/dL 40 (H)   LDl/HDL Ratio      <3.5 2.1   Chol/HDL Ratio      1.0 - 5.0 4.3   TSH      0.49 - 4.67 uIU/mL 1.43   T4 Free      0.61 - 1.60 ng/dL 0.73   Uric Acid      2.6 - 7.2 mg/dL 8.6 (H)   PSA, Ultrasensitive      0.00 - 4.00 ng/mL 0.31       The 10-year ASCVD risk score (Salvador Biswas et al., 2013) is: 6%    Values used to calculate the score:      Age: 49 years      Sex: Male      Is Non- : No      Diabetic: No      Tobacco smoker: No      Systolic Blood Pressure: 203 mmHg      Is BP treated: No      HDL Cholesterol: 34 mg/dL      Total Cholesterol: 145 mg/dL    Lab Results   Component Value Date     09/09/2021    K 3.8 09/09/2021     09/09/2021    CO2 28 09/09/2021    BUN 9 09/09/2021    CREATININE 1.14 09/09/2021    GLUCOSE 97 09/09/2021    CALCIUM 9.2 09/09/2021    PROT 6.9 09/09/2021    LABALBU 4.2 09/09/2021    BILITOT 0.8 09/09/2021    ALKPHOS 107 09/09/2021    AST 18 09/09/2021    ALT 17 09/09/2021    LABGLOM 65 (A) 06/22/2014       Lab Results   Component Value Date    TSH 1.43 09/09/2021    T4FREE 0.73 09/09/2021       Lab Results   Component Value Date    WBC 8.6 09/09/2021    HGB 15.2 09/09/2021    HCT 45.5 09/09/2021    MCV 86 09/09/2021     09/09/2021     Component      Latest Ref Rng & Units 9/9/2021 1/23/2020           3:25 PM  7:10 AM   PSA, Ultrasensitive      0.00 - 4.00 ng/mL 0.31 0.32       Immunization History   Administered Date(s) Administered    COVID-19, Moderna, PF, 100mcg/0.5mL 02/02/2021, 03/06/2021       Health Maintenance   Topic Date Due    DTaP/Tdap/Td vaccine (1 - Tdap) Never done    Shingles Vaccine (1 of 2) Never done    A1C test (Diabetic or Prediabetic)  01/23/2021    Flu vaccine (1) Never done    Lipid screen  09/09/2026    Colon cancer screen colonoscopy  08/10/2028    COVID-19 Vaccine  Completed    Hepatitis A vaccine  Aged Out    Hepatitis B vaccine  Aged Out    Hib vaccine  Aged Out    Meningococcal (ACWY) vaccine  Aged Out    Pneumococcal 0-64 years Vaccine  Aged Out    Hepatitis C screen  Discontinued    HIV screen  Discontinued       AAA ultrasound (Male, 65-75, smoked ever) indicated at this time? No tobacco history  CT Lung Screen (50-80, 20 pk-yrs, smoking or quit <15 years) indicated at this time? No tobacco history   Sleep Medicine referral indicated at this time (Obesity, Snoring, Daytime Somnolence, Apneic Episodes)? Pt denies any current symptoms. No future appointments. ASSESSMENT       Diagnosis Orders   1. Well adult exam     2. IFG (impaired fasting glucose)     3. Hyperlipidemia, unspecified hyperlipidemia type  Triglyceride    HDL Cholesterol   4. H/O: gout  Uric Acid   5. Laboratory exam ordered as part of routine general medical examination  Uric Acid   6. Lipid screening     7. Screening for diabetes mellitus     8. Screening PSA (prostate specific antigen)     9. Class 3 severe obesity due to excess calories without serious comorbidity in adult, unspecified BMI ([address]     10. Elevated uric acid in blood  Uric Acid       PLAN      After discussion with pt, will hold on statins at this time and instead, continue to work on diet, exercise, and weight loss for optimal cardiovascular health  Will hold on Allopurinol at this time per pt preference.    Check TG, HDL, and uric acid in 6 months   Continue current meds  No refills needed. Follow up in 12 months. Preventive Health Topics:  Pt declines HEP C screening at this time due to lack of risk factors. Encouraged COVID VACCINE booster when able. Encouraged annual FLU VACCINE after October 1st.  Encouraged TETANUS SHOT (TdaP/ ADACEL/ BOOSTRIX)- check with insurance re coverage and location of administration. Encouraged SHINGLES SHOT [facility]) - check with insurance re coverage and location of administration. COLONOSCOPY done 8/10/2018 per Dr. Garfield Ware- to follow up again in 2028.  (updated 9/20/2021)                 Electronically signed Daquan An MD on 9/20/2021 at 3:07 PM

## 2021-09-20 ENCOUNTER — OFFICE VISIT (OUTPATIENT)
Dept: FAMILY MEDICINE CLINIC | Age: 56
End: 2021-09-20
Payer: COMMERCIAL

## 2021-09-20 VITALS
RESPIRATION RATE: 16 BRPM | DIASTOLIC BLOOD PRESSURE: 96 MMHG | SYSTOLIC BLOOD PRESSURE: 144 MMHG | HEIGHT: 70 IN | BODY MASS INDEX: 42.12 KG/M2 | TEMPERATURE: 98.1 F | WEIGHT: 294.2 LBS | HEART RATE: 84 BPM

## 2021-09-20 DIAGNOSIS — Z00.00 WELL ADULT EXAM: Primary | ICD-10-CM

## 2021-09-20 DIAGNOSIS — Z87.39 H/O: GOUT: ICD-10-CM

## 2021-09-20 DIAGNOSIS — Z13.1 SCREENING FOR DIABETES MELLITUS: ICD-10-CM

## 2021-09-20 DIAGNOSIS — Z13.220 LIPID SCREENING: ICD-10-CM

## 2021-09-20 DIAGNOSIS — E79.0 ELEVATED URIC ACID IN BLOOD: ICD-10-CM

## 2021-09-20 DIAGNOSIS — E66.01 CLASS 3 SEVERE OBESITY DUE TO EXCESS CALORIES WITHOUT SERIOUS COMORBIDITY IN ADULT, UNSPECIFIED BMI (HCC): ICD-10-CM

## 2021-09-20 DIAGNOSIS — R73.01 IFG (IMPAIRED FASTING GLUCOSE): ICD-10-CM

## 2021-09-20 DIAGNOSIS — Z00.00 LABORATORY EXAM ORDERED AS PART OF ROUTINE GENERAL MEDICAL EXAMINATION: ICD-10-CM

## 2021-09-20 DIAGNOSIS — E78.5 HYPERLIPIDEMIA, UNSPECIFIED HYPERLIPIDEMIA TYPE: ICD-10-CM

## 2021-09-20 DIAGNOSIS — Z12.5 SCREENING PSA (PROSTATE SPECIFIC ANTIGEN): ICD-10-CM

## 2021-09-20 PROCEDURE — 99396 PREV VISIT EST AGE 40-64: CPT | Performed by: FAMILY MEDICINE

## 2021-09-20 SDOH — ECONOMIC STABILITY: FOOD INSECURITY: WITHIN THE PAST 12 MONTHS, YOU WORRIED THAT YOUR FOOD WOULD RUN OUT BEFORE YOU GOT MONEY TO BUY MORE.: PATIENT DECLINED

## 2021-09-20 SDOH — ECONOMIC STABILITY: FOOD INSECURITY: WITHIN THE PAST 12 MONTHS, THE FOOD YOU BOUGHT JUST DIDN'T LAST AND YOU DIDN'T HAVE MONEY TO GET MORE.: PATIENT DECLINED

## 2021-09-20 ASSESSMENT — ENCOUNTER SYMPTOMS
NAUSEA: 0
VOMITING: 0
BLOOD IN STOOL: 0
ABDOMINAL PAIN: 0
CONSTIPATION: 0
CHEST TIGHTNESS: 0
SHORTNESS OF BREATH: 0
DIARRHEA: 0
ANAL BLEEDING: 0

## 2021-09-20 ASSESSMENT — SOCIAL DETERMINANTS OF HEALTH (SDOH): HOW HARD IS IT FOR YOU TO PAY FOR THE VERY BASICS LIKE FOOD, HOUSING, MEDICAL CARE, AND HEATING?: PATIENT DECLINED

## 2021-09-20 ASSESSMENT — PATIENT HEALTH QUESTIONNAIRE - PHQ9
SUM OF ALL RESPONSES TO PHQ QUESTIONS 1-9: 0
2. FEELING DOWN, DEPRESSED OR HOPELESS: 0
SUM OF ALL RESPONSES TO PHQ9 QUESTIONS 1 & 2: 0
1. LITTLE INTEREST OR PLEASURE IN DOING THINGS: 0
SUM OF ALL RESPONSES TO PHQ QUESTIONS 1-9: 0
SUM OF ALL RESPONSES TO PHQ QUESTIONS 1-9: 0

## 2021-09-20 NOTE — PATIENT INSTRUCTIONS
After discussion with pt, will hold on statins at this time and instead, continue to work on diet, exercise, and weight loss for optimal cardiovascular health  Will hold on Allopurinol at this time per pt preference. Check TG, HDL, and uric acid in 6 months   Continue current meds  No refills needed. Follow up in 12 months. Preventive Health Topics:  Pt declines HEP C screening at this time due to lack of risk factors. Encouraged COVID VACCINE booster when able. Encouraged annual FLU VACCINE after October 1st.  Encouraged TETANUS SHOT (TdaP/ ADACEL/ BOOSTRIX)- check with insurance re coverage and location of administration. Encouraged SHINGLES SHOT [facility]) - check with insurance re coverage and location of administration. COLONOSCOPY done 8/10/2018 per Dr. Arya Rosales- to follow up again in 2028.  (updated 9/20/2021)

## 2022-03-30 LAB
HDLC SERPL-MCNC: 33 MG/DL
TRIGL SERPL-MCNC: 287 MG/DL (ref 27–150)
URIC ACID: 8.6 MG/DL (ref 2.6–7.2)

## 2022-04-04 ENCOUNTER — TELEPHONE (OUTPATIENT)
Dept: FAMILY MEDICINE CLINIC | Age: 57
End: 2022-04-04

## 2022-04-04 DIAGNOSIS — Z00.00 ANNUAL PHYSICAL EXAM: Primary | ICD-10-CM

## 2022-04-04 DIAGNOSIS — Z12.5 SCREENING FOR PROSTATE CANCER: ICD-10-CM

## 2022-04-04 DIAGNOSIS — Z87.39 H/O: GOUT: ICD-10-CM

## 2022-04-04 DIAGNOSIS — E79.0 ELEVATED URIC ACID IN BLOOD: ICD-10-CM

## 2022-04-04 NOTE — TELEPHONE ENCOUNTER
----- Message from Abdulkadir Mendez MD sent at 3/30/2022 10:43 AM EDT -----  Notify pt-   Results reviewed. Uric acid stable and elevated at 8.6- has patient had any symptoms of gout? Is patient interested in medicines to prevent gout at this time? TG significantly elevated at 287 and HDL low at 33-is patient watching diet, as well as working on exercise and weight loss? Is patient interested in trying statin at this time, as numbers worse since last check? Let me know.   ES

## 2022-04-07 NOTE — TELEPHONE ENCOUNTER
Discussion noted. Check FLP, CMP, PSA, and uric acid in 6 months if patient opts not to try medication at this time.   No need for recheck CBC or free T4/TSH as most recent studies in September 2021 all normal.  ES

## 2022-04-07 NOTE — TELEPHONE ENCOUNTER
Discussed with patient. He would like to take sometime and think about both therapies. Will send a [employer] message if interested in this. I have rescheduled his fall appointment at your new location. Patient would like his lab orders mailed so that he can complete prior. Please advise on orders.

## 2022-04-13 ENCOUNTER — PATIENT MESSAGE (OUTPATIENT)
Dept: FAMILY MEDICINE CLINIC | Age: 57
End: 2022-04-13

## 2022-04-13 DIAGNOSIS — E78.5 HYPERLIPIDEMIA, UNSPECIFIED HYPERLIPIDEMIA TYPE: Primary | ICD-10-CM

## 2022-04-13 NOTE — TELEPHONE ENCOUNTER
From: Alina De Jesus  To: Dr. Lara Dietz: 4/13/2022 3:41 PM EDT  Subject: Prescription     Dr. Janny James,  I received a call about my most recent test results. I think Ill have you start me on the medication for the triglycerides but hold off for now on the gout meds. What do I need to do now to make that happen? Thanks!  Lyle Honeycutt

## 2022-04-13 NOTE — TELEPHONE ENCOUNTER
Discussion noted. Okay to hold off on medicines for gout as patient desires. Start Lipitor 20 mg - 1 pill daily. #30/1 refill  Recheck TG, HDL, AST/ALT in 6-8 weeks.   ES

## 2022-04-14 RX ORDER — ATORVASTATIN CALCIUM 20 MG/1
20 TABLET, FILM COATED ORAL DAILY
Qty: 30 TABLET | Refills: 1 | Status: SHIPPED | OUTPATIENT
Start: 2022-04-14 | End: 2022-06-29

## 2022-06-27 NOTE — TELEPHONE ENCOUNTER
Request sent from [facility] for refill of Lipitor 20 mg qd. Last seen 9/20/21, next appt 12/1/22. Due for labs at this time. Message sent to pt to see if he has completed/will be completing soon.

## 2022-06-29 RX ORDER — ATORVASTATIN CALCIUM 20 MG/1
TABLET, FILM COATED ORAL
Qty: 30 TABLET | Refills: 0 | Status: SHIPPED | OUTPATIENT
Start: 2022-06-29 | End: 2022-07-26 | Stop reason: SDUPTHER

## 2022-06-29 NOTE — TELEPHONE ENCOUNTER
Pts wife called stating that the pt has the orders to get the labs done soon but has run out of Lipitor and is requesting a 30 day supply to be sent to [facility] to use until he gets the labs completed. Rx verified, ordered and set to EP.     WCP

## 2022-07-14 LAB
ALT SERPL-CCNC: 18 U/L (ref 5–50)
AST SERPL-CCNC: 23 U/L (ref 9–50)
HDLC SERPL-MCNC: 33 MG/DL
TRIGL SERPL-MCNC: 140 MG/DL

## 2022-07-26 RX ORDER — ATORVASTATIN CALCIUM 20 MG/1
20 TABLET, FILM COATED ORAL DAILY
Qty: 90 TABLET | Refills: 3 | Status: SHIPPED | OUTPATIENT
Start: 2022-07-26

## 2022-07-26 NOTE — TELEPHONE ENCOUNTER
OK for refill-#90/3 refills. Lab Results   Component Value Date    CHOL 145 (L) 09/09/2021    TRIG 140 07/14/2022    HDL 33 (L) 07/14/2022    LDLCALC 71 09/09/2021    LDLDIRECT 78 01/23/2020     Lab Results   Component Value Date    ALT 18 07/14/2022    AST 23 07/14/2022    ALKPHOS 107 09/09/2021    BILITOT 0.8 09/09/2021     Please notify patient that most recent liver tests normal and TG improved at 140. HDL still slightly low at 33. Continue current dose of Lipitor.   ES

## 2022-07-26 NOTE — TELEPHONE ENCOUNTER
Patient's last appointment was : Visit date not found  Patient's next appointment is :   Future Appointments   Date Time Provider Re Guerra   12/1/2022  8:00 AM Bella Segundo MD [facility]     Last refilled:    Lab Results   Component Value Date    LABA1C 5.8 01/23/2020     Lab Results   Component Value Date    CHOL 145 (L) 09/09/2021    TRIG 140 07/14/2022    HDL 33 (L) 07/14/2022    LDLCALC 71 09/09/2021    LDLDIRECT 78 01/23/2020     Lab Results   Component Value Date     09/09/2021    K 3.8 09/09/2021     09/09/2021    CO2 28 09/09/2021    BUN 9 09/09/2021    CREATININE 1.14 09/09/2021    GLUCOSE 97 09/09/2021    CALCIUM 9.2 09/09/2021    PROT 6.9 09/09/2021    LABALBU 4.2 09/09/2021    BILITOT 0.8 09/09/2021    ALKPHOS 107 09/09/2021    AST 23 07/14/2022    ALT 18 07/14/2022    LABGLOM 65 (A) 06/22/2014     Lab Results   Component Value Date    TSH 1.43 09/09/2021    T4FREE 0.73 09/09/2021     Lab Results   Component Value Date    WBC 8.6 09/09/2021    HGB 15.2 09/09/2021    HCT 45.5 09/09/2021    MCV 86 09/09/2021     09/09/2021

## 2022-11-18 NOTE — PROGRESS NOTES
[address] [facility] [place] [address] [address] [address]  Dept: [phone]  Dept Fax: [phone]  JOSETTE Diaz is a 56 y. o.male    Pt presents for annual wellness physical exam.      Patient also presents for follow-up of IFG, Hyperlipidemia, and Gout. Glucometer readings at home are not needed. The home BP readings have not been checked regularly. Pt doing well at this time, except pt complains of right shoulder and eight antecubital fossa- took Ibuprofen x 2 with essentially complete relief. No issues currently. Wt Readings from Last 3 Encounters:   12/01/22 (!) 300 lb 9.6 oz (136.4 kg)   09/20/21 294 lb 3.2 oz (133.4 kg)   02/14/20 287 lb (130.2 kg)   Weight increased 6# since last visit 15 months ago. Pt admits to dietary indiscretion (somewhat irregular meal times and occasional fast food)     Pt stable since last visit- no new problems for diagnoses listed below:  Patient Active Problem List   Diagnosis    Hyperlipidemia    IFG (impaired fasting glucose)    H/O: gout     Review of Systems   Constitutional:  Negative for chills, diaphoresis, fatigue, fever and unexpected weight change. Eyes:  Negative for visual disturbance. Respiratory:  Negative for chest tightness and shortness of breath. Cardiovascular:  Negative for chest pain, palpitations and leg swelling. Gastrointestinal:  Negative for abdominal pain, anal bleeding, blood in stool, constipation, diarrhea, nausea and vomiting. Genitourinary:  Negative for dysuria and hematuria. Musculoskeletal:  Negative for neck pain. Neurological:  Negative for dizziness, light-headedness and headaches.      OBJECTIVE     /78 (Site: Left Upper Arm, Position: Sitting, Cuff Size: Large Adult)   Pulse 78   Temp 97.4 °F (36.3 °C) (Skin)   Resp 16   Ht 5' 10\" (1.778 m)   Wt (!) 300 lb 9.6 oz (136.4 kg)   SpO2 98%   BMI 43.13 kg/m²     Wt Readings from Last 3 Encounters: 12/01/22 (!) 300 lb 9.6 oz (136.4 kg)   09/20/21 294 lb 3.2 oz (133.4 kg)   02/14/20 287 lb (130.2 kg)     Physical Exam  Vitals and nursing note reviewed. Constitutional:       Appearance: He is well-developed. HENT:      Head: Normocephalic and atraumatic. Right Ear: External ear normal.      Left Ear: External ear normal.      Nose: Nose normal.   Eyes:      Conjunctiva/sclera: Conjunctivae normal.      Pupils: Pupils are equal, round, and reactive to light. Cardiovascular:      Rate and Rhythm: Normal rate and regular rhythm. Pulmonary:      Effort: Pulmonary effort is normal.      Breath sounds: Normal breath sounds. Abdominal:      General: Bowel sounds are normal.      Palpations: Abdomen is soft. Musculoskeletal:         General: Normal range of motion. Cervical back: Normal range of motion and neck supple. Skin:     General: Skin is warm and dry. Neurological:      Mental Status: He is alert and oriented to person, place, and time. Deep Tendon Reflexes: Reflexes are normal and symmetric. Psychiatric:         Behavior: Behavior normal.         Thought Content:  Thought content normal.         Judgment: Judgment normal.       Component      Latest Ref Rng & Units 11/25/2022   Glucose, Random      70 - 99 mg/dL 108 (H)   BUN,BUNPL      6 - 20 mg/dL 10   Creatinine      0.80 - 1.40 mg/dL 1.04   EGFR IF NonAfrican American      >60 mL/min/1.73 85   CALCIUM, SERUM, [id number]      8.5 - 10.5 mg/dL 9.4   Sodium      133 - 146 meq/L 143   Potassium      3.5 - 5.4 meq/L 3.9   Chloride      95 - 107 meq/L 107   CO2      19 - 31 meq/L 25   Anion Gap      7.0 - 16.0 meq/L 11.0   Bilirubin      <=1.2 mg/dL 0.6   Alk Phosphatase      40 - 121 U/L 138 (H)   AST      9 - 50 U/L 20   ALT      5 - 50 U/L 22   Total Protein      6.1 - 8.3 g/dL 6.1   Albumin      3.5 - 5.2 g/dL 4.3   Cholesterol      <200 mg/dL 102   Triglycerides      <149 mg/dL 114   HDL Cholesterol      >39 mg/dL 41   LDL Calculated      <100 mg/dL 38   VLDL Cholesterol Calculated      <30 mg/dL 23   LDL/HDL Ratio      <3.55 RATIO 0.9   Chol/HDL Ratio      <4.97 RATIO 2.5   PSA, Ultrasensitive      <=4.00 ng/mL 0.41   URIC ACID,URA      3.7 - 8.0 mg/dL 6.9       Lab Results   Component Value Date    LABA1C 5.5 12/01/2022     No results found for: EAG    Lab Results   Component Value Date    CHOL 102 11/25/2022    TRIG 114 11/25/2022    HDL 41 11/25/2022    LDLCALC 38 11/25/2022    LDLDIRECT 78 01/23/2020       The ASCVD Risk score (Rolando REILLY, et al., 2019) failed to calculate for the following reasons: The valid total cholesterol range is 130 to 320 mg/dL    Lab Results   Component Value Date     11/25/2022    K 3.9 11/25/2022     11/25/2022    CO2 25 11/25/2022    BUN 10 11/25/2022    CREATININE 1.04 11/25/2022    GLUCOSE 108 (H) 11/25/2022    CALCIUM 9.4 11/25/2022    PROT 6.1 11/25/2022    LABALBU 4.3 11/25/2022    BILITOT 0.6 11/25/2022    ALKPHOS 138 (H) 11/25/2022    AST 20 11/25/2022    ALT 22 11/25/2022    LABGLOM 65 (A) 06/22/2014     estimated creatinine clearance is 112 mL/min (based on SCr of 1.04 mg/dL). No results found for: Yin Bone    Lab Results   Component Value Date    TSH 1.43 09/09/2021    T4FREE 0.73 09/09/2021       Lab Results   Component Value Date    WBC 8.6 09/09/2021    HGB 15.2 09/09/2021    HCT 45.5 09/09/2021    MCV 86 09/09/2021     09/09/2021     Component      Latest Ref Rng & Units 11/25/2022 9/9/2021 1/23/2020   PSA, Ultrasensitive      <=4.00 ng/mL 0.41 0.31 0.32     There is no immunization history for the selected administration types on file for this patient.         Health Maintenance   Topic Date Due    DTaP/Tdap/Td vaccine (1 - Tdap) Never done    Shingles vaccine (1 of 2) Never done    Depression Screen  09/20/2022    Flu vaccine (1) 12/01/2023 (Originally 8/1/2022)    COVID-19 Vaccine (4 - Booster for Joanna  series) 12/01/2023 (Originally 1/25/2022) Lipids  11/25/2023    A1C test (Diabetic or Prediabetic)  12/01/2023    Colorectal Cancer Screen  08/10/2028    Hepatitis A vaccine  Aged Out    Hib vaccine  Aged Out    Meningococcal (ACWY) vaccine  Aged Out    Pneumococcal 0-64 years Vaccine  Aged Out    Hepatitis C screen  Discontinued    HIV screen  Discontinued       CT Lung Screen (50-80, 20 pk-yrs, smoking or quit <15 years) indicated at this time? No tobacco history  Sleep Medicine referral indicated at this time (Obesity, Snoring, Daytime Somnolence, Apneic Episodes)? Pt denies any current symptoms. No future appointments. ASSESSMENT       Diagnosis Orders   1. IFG (impaired fasting glucose)  POCT glycosylated hemoglobin (Hb A1C)      2. Well adult exam        3. Hyperlipidemia, unspecified hyperlipidemia type        4. H/O: gout        5. Elevated alkaline phosphatase level  Alkaline Phosphatase          PLAN      Will hold on Allopurinol at this time  Continue to work on diet, exercise (30 minutes, 5x/week), and weight loss for optimal cardiovascular health. Check POCT HGAIC at this time- done    Recheck ALK PHOS in 6-8 weeks to assure normalization- will consider ALK PHOS ISOENZYMES in future if remain elevated. Continue current meds  No refills needed. Follow up in 12 months if feeling well and recheck labs ok. Preventive Health Topics:  Encouraged COVID VACCINE booster- pt declines   Encouraged annual FLU VACCINE- pt declines   Encouraged TETANUS SHOT (TdaP/ ADACEL/ BOOSTRIX)- check with insurance re coverage and location of administration. Encouraged SHINGLES SHOT [facility]) - check with insurance re coverage and location of administration. (updated 12/1/2022)  COLONOSCOPY done 8/10/2018 per Dr. Gavino Li- to follow up again in 2028.  (updated 12/1/2022)           Electronically signed Skylar Freeman MD on 12/1/2022 at 8:54 AM

## 2022-11-25 LAB
ALBUMIN SERPL-MCNC: 4.3 G/DL (ref 3.5–5.2)
ALK PHOSPHATASE: 138 U/L (ref 40–121)
ALT SERPL-CCNC: 22 U/L (ref 5–50)
ANION GAP SERPL CALCULATED.3IONS-SCNC: 11 MEQ/L (ref 7–16)
AST SERPL-CCNC: 20 U/L (ref 9–50)
BILIRUB SERPL-MCNC: 0.6 MG/DL
BUN BLDV-MCNC: 10 MG/DL (ref 6–20)
CALCIUM SERPL-MCNC: 9.4 MG/DL (ref 8.5–10.5)
CHLORIDE BLD-SCNC: 107 MEQ/L (ref 95–107)
CHOLESTEROL/HDL RATIO: 2.5 RATIO
CHOLESTEROL: 102 MG/DL
CO2: 25 MEQ/L (ref 19–31)
CREAT SERPL-MCNC: 1.04 MG/DL (ref 0.8–1.4)
EGFR IF NONAFRICAN AMERICAN: 85 ML/MIN/1.73
GLUCOSE: 108 MG/DL (ref 70–99)
HDLC SERPL-MCNC: 41 MG/DL
LDL CHOLESTEROL CALCULATED: 38 MG/DL
LDL/HDL RATIO: 0.9 RATIO
POTASSIUM SERPL-SCNC: 3.9 MEQ/L (ref 3.5–5.4)
SODIUM BLD-SCNC: 143 MEQ/L (ref 133–146)
TOTAL PROTEIN: 6.1 G/DL (ref 6.1–8.3)
TRIGL SERPL-MCNC: 114 MG/DL
URIC ACID: 6.9 MG/DL (ref 3.7–8)
VLDLC SERPL CALC-MCNC: 23 MG/DL

## 2022-11-26 LAB — PSA, ULTRASENSITIVE: 0.41 NG/ML

## 2022-12-01 ENCOUNTER — OFFICE VISIT (OUTPATIENT)
Dept: FAMILY MEDICINE CLINIC | Age: 57
End: 2022-12-01
Payer: COMMERCIAL

## 2022-12-01 VITALS
TEMPERATURE: 97.4 F | SYSTOLIC BLOOD PRESSURE: 124 MMHG | OXYGEN SATURATION: 98 % | DIASTOLIC BLOOD PRESSURE: 78 MMHG | BODY MASS INDEX: 43.03 KG/M2 | HEART RATE: 78 BPM | RESPIRATION RATE: 16 BRPM | WEIGHT: 300.6 LBS | HEIGHT: 70 IN

## 2022-12-01 DIAGNOSIS — Z87.39 H/O: GOUT: ICD-10-CM

## 2022-12-01 DIAGNOSIS — Z00.00 WELL ADULT EXAM: ICD-10-CM

## 2022-12-01 DIAGNOSIS — R74.8 ELEVATED ALKALINE PHOSPHATASE LEVEL: ICD-10-CM

## 2022-12-01 DIAGNOSIS — R73.01 IFG (IMPAIRED FASTING GLUCOSE): Primary | ICD-10-CM

## 2022-12-01 DIAGNOSIS — E78.5 HYPERLIPIDEMIA, UNSPECIFIED HYPERLIPIDEMIA TYPE: ICD-10-CM

## 2022-12-01 LAB
HBA1C MFR BLD: 5.5 %
HBA1C MFR BLD: 5.5 % (ref 4.3–5.7)

## 2022-12-01 PROCEDURE — G8484 FLU IMMUNIZE NO ADMIN: HCPCS | Performed by: FAMILY MEDICINE

## 2022-12-01 PROCEDURE — 83036 HEMOGLOBIN GLYCOSYLATED A1C: CPT | Performed by: FAMILY MEDICINE

## 2022-12-01 PROCEDURE — 99396 PREV VISIT EST AGE 40-64: CPT | Performed by: FAMILY MEDICINE

## 2022-12-01 SDOH — ECONOMIC STABILITY: FOOD INSECURITY: WITHIN THE PAST 12 MONTHS, THE FOOD YOU BOUGHT JUST DIDN'T LAST AND YOU DIDN'T HAVE MONEY TO GET MORE.: NEVER TRUE

## 2022-12-01 SDOH — ECONOMIC STABILITY: FOOD INSECURITY: WITHIN THE PAST 12 MONTHS, YOU WORRIED THAT YOUR FOOD WOULD RUN OUT BEFORE YOU GOT MONEY TO BUY MORE.: NEVER TRUE

## 2022-12-01 ASSESSMENT — PATIENT HEALTH QUESTIONNAIRE - PHQ9
SUM OF ALL RESPONSES TO PHQ QUESTIONS 1-9: 0
SUM OF ALL RESPONSES TO PHQ QUESTIONS 1-9: 0
SUM OF ALL RESPONSES TO PHQ9 QUESTIONS 1 & 2: 0
SUM OF ALL RESPONSES TO PHQ QUESTIONS 1-9: 0
1. LITTLE INTEREST OR PLEASURE IN DOING THINGS: 0
2. FEELING DOWN, DEPRESSED OR HOPELESS: 0
SUM OF ALL RESPONSES TO PHQ QUESTIONS 1-9: 0

## 2022-12-01 ASSESSMENT — ENCOUNTER SYMPTOMS
ABDOMINAL PAIN: 0
CONSTIPATION: 0
DIARRHEA: 0
NAUSEA: 0
VOMITING: 0
SHORTNESS OF BREATH: 0
ANAL BLEEDING: 0
CHEST TIGHTNESS: 0
BLOOD IN STOOL: 0

## 2022-12-01 ASSESSMENT — SOCIAL DETERMINANTS OF HEALTH (SDOH): HOW HARD IS IT FOR YOU TO PAY FOR THE VERY BASICS LIKE FOOD, HOUSING, MEDICAL CARE, AND HEATING?: NOT HARD AT ALL

## 2022-12-01 NOTE — PROGRESS NOTES
Health Maintenance Due   Topic Date Due    DTaP/Tdap/Td vaccine (1 - Tdap) Never done    Shingles vaccine (1 of 2) Never done    A1C test (Diabetic or Prediabetic)  01/23/2021    COVID-19 Vaccine (4 - Booster for Moderna series) 01/25/2022    Flu vaccine (1) Never done    Depression Screen  09/20/2022

## 2022-12-01 NOTE — PATIENT INSTRUCTIONS
Will hold on Allopurinol at this time  Continue to work on diet, exercise (30 minutes, 5x/week), and weight loss for optimal cardiovascular health. Check POCT HGAIC at this time- done    Recheck ALK PHOS in 6-8 weeks to assure normalization- will consider ALK PHOS ISOENZYMES in future if remain elevated. Continue current meds  No refills needed. Follow up in 12 months if feeling well and recheck labs ok. Preventive Health Topics:  Encouraged COVID VACCINE booster- pt declines   Encouraged annual FLU VACCINE- pt declines   Encouraged TETANUS SHOT (TdaP/ ADACEL/ BOOSTRIX)- check with insurance re coverage and location of administration. Encouraged SHINGLES SHOT [facility]) - check with insurance re coverage and location of administration. (updated 12/1/2022)  COLONOSCOPY done 8/10/2018 per Dr. Estefani Gonzalez- to follow up again in 2028.  (updated 12/1/2022)

## 2023-01-17 ENCOUNTER — HOSPITAL ENCOUNTER (EMERGENCY)
Age: 58
Discharge: HOME | End: 2023-01-17
Payer: COMMERCIAL

## 2023-01-17 VITALS
WEIGHT: 275 LBS | RESPIRATION RATE: 18 BRPM | SYSTOLIC BLOOD PRESSURE: 161 MMHG | OXYGEN SATURATION: 94 % | HEIGHT: 69 IN | TEMPERATURE: 97 F | BODY MASS INDEX: 40.73 KG/M2 | HEART RATE: 93 BPM | DIASTOLIC BLOOD PRESSURE: 101 MMHG

## 2023-01-17 DIAGNOSIS — R09.89 CHEST CONGESTION: ICD-10-CM

## 2023-01-17 DIAGNOSIS — J20.9 ACUTE BRONCHITIS, UNSPECIFIED ORGANISM: Primary | ICD-10-CM

## 2023-01-17 PROCEDURE — 99213 OFFICE O/P EST LOW 20 MIN: CPT

## 2023-01-17 PROCEDURE — 99213 OFFICE O/P EST LOW 20 MIN: CPT | Performed by: NURSE PRACTITIONER

## 2023-01-17 RX ORDER — FLUTICASONE PROPIONATE 50 MCG
1 SPRAY, SUSPENSION (ML) NASAL DAILY
Qty: 16 G | Refills: 0 | Status: SHIPPED | OUTPATIENT
Start: 2023-01-17

## 2023-01-17 RX ORDER — AZITHROMYCIN 250 MG/1
TABLET, FILM COATED ORAL
Qty: 1 PACKET | Refills: 0 | Status: SHIPPED | OUTPATIENT
Start: 2023-01-17 | End: 2023-01-21

## 2023-01-17 RX ORDER — PREDNISONE 10 MG/1
TABLET ORAL
Qty: 21 TABLET | Refills: 0 | Status: SHIPPED | OUTPATIENT
Start: 2023-01-17 | End: 2023-01-26

## 2023-01-17 ASSESSMENT — ENCOUNTER SYMPTOMS
WHEEZING: 0
CHEST TIGHTNESS: 1
SORE THROAT: 0
COUGH: 1
SHORTNESS OF BREATH: 0

## 2023-01-17 ASSESSMENT — PAIN - FUNCTIONAL ASSESSMENT: PAIN_FUNCTIONAL_ASSESSMENT: NONE - DENIES PAIN

## 2023-01-17 NOTE — ED TRIAGE NOTES
Pt with complaints of a cough and chest congestion that started 3 days ago. States the cough is worse at night when laying down. States he has tried Musinex DM but it does not help. BP noted to be 161/101, pt denies any symptoms.

## 2023-01-17 NOTE — ED PROVIDER NOTES
[facility] 36  Urgent Care Encounter       CHIEF COMPLAINT       Chief Complaint   Patient presents with    Cough       Nurses Notes reviewed and I agree except as noted in the HPI. HISTORY OF PRESENT ILLNESS   Ximena Cole is a 56 y.o. male who presents of a new cough, chest congestion, and chest tightness. Patient states his cough is uncontrolled. He is having to sleep upright due to coughing. Denies any shortness of breath or chest pain. His oxygen saturation is 94% on room air upon arrival.  Blood pressure is elevated as well but has been taking Mucinex. The treatment has been ineffective. He denies any fever, body aches, or headache. The history is provided by the patient. REVIEW OF SYSTEMS     Review of Systems   Constitutional:  Negative for chills and fever. HENT:  Positive for postnasal drip. Negative for congestion and sore throat. Respiratory:  Positive for cough and chest tightness. Negative for shortness of breath and wheezing. Cardiovascular:  Negative for chest pain. Musculoskeletal:  Negative for myalgias. Neurological:  Negative for headaches. PAST MEDICAL HISTORY         Diagnosis Date    History of kidney stones 2014    Dr Moy Weaver    Hyperlipidemia     Kidney stone        SURGICALHISTORY     Patient  has a past surgical history that includes Colonoscopy (4/12); Tonsillectomy (as a child ); Arm Surgery (Left, as a child ); cyst removal (Left, 12/16/2013); and Skin cancer excision (6-2014). CURRENT MEDICATIONS       Previous Medications    ATORVASTATIN (LIPITOR) 20 MG TABLET    Take 1 tablet by mouth in the morning. IBUPROFEN (ADVIL;MOTRIN) 200 MG TABLET    Take 200 mg by mouth every 6 hours as needed for Pain. ALLERGIES     Patient is has No Known Allergies. Patients There is no immunization history for the selected administration types on file for this patient.     FAMILY HISTORY     Patient's family history includes Heart Disease in his father; Heart Surgery in his father; Hypertension in his father and mother. SOCIAL HISTORY     Patient  reports that he has never smoked. He has never used smokeless tobacco. He reports that he does not drink alcohol and does not use drugs. PHYSICAL EXAM     ED TRIAGE VITALS  BP: (!) 161/101, Temp: 97 °F (36.1 °C), Heart Rate: 93, Resp: 18, SpO2: 94 %,Estimated body mass index is 40.61 kg/m² as calculated from the following:    Height as of this encounter: 5' 9\" (1.753 m). Weight as of this encounter: 275 lb (124.7 kg). ,No LMP for male patient. Physical Exam  Vitals and nursing note reviewed. Constitutional:       General: He is not in acute distress. Appearance: He is ill-appearing. HENT:      Right Ear: Tympanic membrane, ear canal and external ear normal.      Left Ear: Tympanic membrane, ear canal and external ear normal.      Nose: Rhinorrhea present. No congestion. Mouth/Throat:      Mouth: Mucous membranes are moist.      Pharynx: No posterior oropharyngeal erythema. Cardiovascular:      Rate and Rhythm: Normal rate and regular rhythm. Heart sounds: Normal heart sounds. Pulmonary:      Effort: Pulmonary effort is normal.      Breath sounds: Decreased air movement present. Lymphadenopathy:      Cervical: No cervical adenopathy. Skin:     General: Skin is warm and dry. Neurological:      Mental Status: He is alert and oriented to person, place, and time. DIAGNOSTIC RESULTS     Labs:No results found for this visit on 01/17/23. IMAGING:  None    EKG:  None    URGENT CARE COURSE:     Vitals:    01/17/23 0811 01/17/23 0816   BP:  (!) 161/101   Pulse: 93    Resp: 18    Temp: 97 °F (36.1 °C)    TempSrc: Temporal    SpO2: 94%    Weight: 275 lb (124.7 kg)    Height: 5' 9\" (1.753 m)        Medications - No data to display       PROCEDURES:  None    FINAL IMPRESSION      1. Acute bronchitis, unspecified organism    2.  Chest congestion      DISPOSITION/ PLAN DISPOSITION Decision To Discharge 01/17/2023 08:27:33 AM     Patient oxygen saturation 94% with a tight cough. Opted to treat patient with azithromycin and tapering dose of prednisone. In addition, recommended patient use Flonase. Advised patient to follow-up with PCP if blood pressure continues to be elevated or if cough persist does not improve. PATIENT REFERRED TO:  Patel Hoffmann MD  [place] [id number] / [facility] [address]      DISCHARGE MEDICATIONS:  New Prescriptions    AZITHROMYCIN (ZITHROMAX Z-SHONNA) 250 MG TABLET    Take 2 tablets (500 mg) on Day 1, and then take 1 tablet (250 mg) on days 2 through 5. FLUTICASONE (FLONASE) 50 MCG/ACT NASAL SPRAY    1 spray by Each Nostril route daily    PREDNISONE (DELTASONE) 10 MG TABLET    Take 2 tablets by mouth 2 times daily for 3 days, THEN 1 tablet 2 times daily for 3 days, THEN 1 tablet daily for 3 days.        Discontinued Medications    No medications on file       Current Discharge Medication List          CALEB Rodriguez CNP    (Please note that portions of this note were completed with a voice recognition program. Efforts were made to edit the dictations but occasionally words are mis-transcribed.)           CALEB Rodriguez CNP  01/17/23 9148

## 2023-02-10 LAB
ALKALINE PHOSPHATASE - LIVER 2,SER/PLAS,QN: 5.4 U/L (ref 0–9.6)
ALKALINE PHOSPHATASE BONE FRACTION: 26.9 % (ref 19.1–67.7)
ALKALINE PHOSPHATASE INTESTINE FRACTION: 0 % (ref 0–20.6)
ALKALINE PHOSPHATASE LIVER 1%: 68.7 % (ref 27.8–76.3)
ALKALINE PHOSPHATASE LIVER 1: 84.5 U/L (ref 11.1–91.6)
ALKALINE PHOSPHATASE LIVER 2%: 4.4 % (ref 0–8)
ALKALINE PHOSPHATASE PLACENTAL FRACTION: 0 %
ALKALINE PHOSPHATASE, INTESTINAL: 0 U/L (ref 0–24.7)
ALKALINE PHOSPHATASE, PLACENTAL: 0 U/L
BONE ISOENZYME: 33.1 U/L (ref 7.6–81.2)
TOTAL ALKALINE PHOSPHATASE: 123 U/L (ref 40–120)

## 2023-07-31 DIAGNOSIS — E78.5 HYPERLIPIDEMIA, UNSPECIFIED HYPERLIPIDEMIA TYPE: Primary | ICD-10-CM

## 2023-07-31 RX ORDER — ATORVASTATIN CALCIUM 20 MG/1
20 TABLET, FILM COATED ORAL DAILY
Qty: 90 TABLET | Refills: 3 | Status: SHIPPED | OUTPATIENT
Start: 2023-07-31 | End: 2023-07-31 | Stop reason: SDUPTHER

## 2023-07-31 RX ORDER — FLUTICASONE PROPIONATE 50 MCG
1 SPRAY, SUSPENSION (ML) NASAL DAILY
Qty: 16 G | Refills: 11 | Status: SHIPPED | OUTPATIENT
Start: 2023-07-31

## 2023-07-31 RX ORDER — ATORVASTATIN CALCIUM 20 MG/1
20 TABLET, FILM COATED ORAL DAILY
Qty: 90 TABLET | Refills: 3 | Status: SHIPPED | OUTPATIENT
Start: 2023-07-31

## 2023-07-31 NOTE — TELEPHONE ENCOUNTER
Pt called in stating that due to a change of insurance policies all of his medications will need to be sent to [facility]. Thank you!

## 2023-07-31 NOTE — TELEPHONE ENCOUNTER
Patient's last appointment was : 12/1/2022  Patient's next appointment is :  1/4/2024  Last refilled:1/17/2022   Pharmacy Verified    Lab Results   Component Value Date    LABA1C 5.5 12/01/2022     Lab Results   Component Value Date    CHOL 102 11/25/2022    TRIG 114 11/25/2022    HDL 41 11/25/2022    LDLCALC 38 11/25/2022    LDLDIRECT 78 01/23/2020     Lab Results   Component Value Date     11/25/2022    K 3.9 11/25/2022     11/25/2022    CO2 25 11/25/2022    BUN 10 11/25/2022    CREATININE 1.04 11/25/2022    GLUCOSE 108 (H) 11/25/2022    CALCIUM 9.4 11/25/2022    PROT 6.1 11/25/2022    LABALBU 4.3 11/25/2022    BILITOT 0.6 11/25/2022    ALKPHOS 138 (H) 11/25/2022    AST 20 11/25/2022    ALT 22 11/25/2022    LABGLOM 65 (A) 06/22/2014     Lab Results   Component Value Date    TSH 1.43 09/09/2021    T4FREE 0.73 09/09/2021     Lab Results   Component Value Date    WBC 8.6 09/09/2021    HGB 15.2 09/09/2021    HCT 45.5 09/09/2021    MCV 86 09/09/2021     09/09/2021

## 2023-12-26 NOTE — PROGRESS NOTES
[facility]  [address]  [facility] [address]  Dept: [phone]  Dept Fax: [phone]  SUBJECTIVE     Conrado Guerra is a 58 y.o.male    Pt presents for annual wellness physical exam.      Patient also presents for follow-up of IFG, Hyperlipidemia, and Gout.      Pt retired on 6/30/2023! Doing well- enjoying custodial, staying active.  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      Glucometer readings at home are not necessary.   The home BP readings have not been checked regularly.     Pt doing well at this time- denies any new complaints!    Wt Readings from Last 3 Encounters:   01/04/24 (!) 138.2 kg (304 lb 9.6 oz)   01/17/23 124.7 kg (275 lb)   12/01/22 (!) 136.4 kg (300 lb 9.6 oz)   Weight increased 29# since last visit 12 months ago.  Pt states exercise and diet have fallen off over past 3 months.  Pt planning to watch both more closely in 2024.       Pt stable since last visit- no new problems for diagnoses listed below:  Patient Active Problem List   Diagnosis    Hyperlipidemia    IFG (impaired fasting glucose)    H/O: gout     Review of Systems   Constitutional:  Negative for chills, diaphoresis, fatigue, fever and unexpected weight change.   Eyes:  Negative for visual disturbance.   Respiratory:  Negative for chest tightness and shortness of breath.    Cardiovascular:  Negative for chest pain, palpitations and leg swelling.   Gastrointestinal:  Negative for abdominal pain, anal bleeding, blood in stool, constipation, diarrhea, nausea and vomiting.   Genitourinary:  Negative for dysuria and hematuria.   Musculoskeletal:  Positive for arthralgias (right shoulder pain intermittently- no desire for work-up at this time.). Negative for neck pain.   Neurological:  Negative for dizziness, light-headedness and headaches.     OBJECTIVE     /80 (Site: Left Upper Arm,

## 2023-12-27 LAB
ABSOLUTE BASO #: 0.06 K/UL (ref 0–0.2)
ABSOLUTE EOS #: 0.17 K/UL (ref 0–0.5)
ABSOLUTE LYMPH #: 2.68 K/UL (ref 1–4)
ABSOLUTE MONO #: 0.81 K/UL (ref 0.2–1)
ABSOLUTE NEUT #: 6.14 K/UL (ref 1.5–7.5)
ALBUMIN SERPL-MCNC: 4.4 G/DL (ref 3.5–5.2)
ALK PHOSPHATASE: 149 U/L (ref 40–123)
ALT SERPL-CCNC: 19 U/L (ref 5–50)
ANION GAP SERPL CALCULATED.3IONS-SCNC: 7 MEQ/L (ref 7–16)
AST SERPL-CCNC: 17 U/L (ref 9–50)
AVERAGE GLUCOSE: 126 MG/DL
BASOPHILS RELATIVE PERCENT: 0.6 %
BILIRUB SERPL-MCNC: 0.8 MG/DL
BUN BLDV-MCNC: 14 MG/DL (ref 6–20)
CALCIUM SERPL-MCNC: 9.3 MG/DL (ref 8.5–10.5)
CHLORIDE BLD-SCNC: 104 MEQ/L (ref 95–107)
CHOLESTEROL/HDL RATIO: 3.4 RATIO
CHOLESTEROL: 112 MG/DL
CO2: 31 MEQ/L (ref 19–31)
CREAT SERPL-MCNC: 1.28 MG/DL (ref 0.8–1.4)
EGFR IF NONAFRICAN AMERICAN: 66 ML/MIN/1.73
EOSINOPHILS RELATIVE PERCENT: 1.7 %
GLUCOSE: 101 MG/DL (ref 70–99)
HBA1C MFR BLD: 6 % (ref 4.2–5.6)
HCT VFR BLD CALC: 48.4 % (ref 40–51)
HDLC SERPL-MCNC: 33 MG/DL
HEMOGLOBIN: 16.1 G/DL (ref 13.5–17)
LDL CHOLESTEROL CALCULATED: 29 MG/DL
LDL/HDL RATIO: 0.9 RATIO
LYMPHOCYTE %: 27.1 %
MCH RBC QN AUTO: 28.3 PG (ref 25–33)
MCHC RBC AUTO-ENTMCNC: 33.3 G/DL (ref 31–36)
MCV RBC AUTO: 85.2 FL (ref 80–99)
MONOCYTES # BLD: 8.2 %
NEUTROPHILS RELATIVE PERCENT: 62.1 %
PDW BLD-RTO: 12.8 % (ref 11.5–15)
PLATELETS: 251 K/UL (ref 130–400)
PMV BLD AUTO: 11.5 FL (ref 9.3–13)
POTASSIUM SERPL-SCNC: 4.1 MEQ/L (ref 3.5–5.4)
PSA, ULTRASENSITIVE: 0.43 NG/ML
RBC: 5.68 M/UL (ref 4.5–6.1)
SODIUM BLD-SCNC: 142 MEQ/L (ref 133–146)
T4 FREE: 0.97 NG/DL (ref 0.8–1.9)
TOTAL PROTEIN: 6.5 G/DL (ref 6.1–8.3)
TRIGL SERPL-MCNC: 252 MG/DL
TSH SERPL DL<=0.05 MIU/L-ACNC: 3.71 UIU/ML (ref 0.4–4.1)
URIC ACID: 7.1 MG/DL (ref 3.7–8)
VLDLC SERPL CALC-MCNC: 50 MG/DL
WBC: 9.9 K/UL (ref 3.5–11)

## 2024-01-03 SDOH — ECONOMIC STABILITY: TRANSPORTATION INSECURITY
IN THE PAST 12 MONTHS, HAS LACK OF TRANSPORTATION KEPT YOU FROM MEETINGS, WORK, OR FROM GETTING THINGS NEEDED FOR DAILY LIVING?: NO

## 2024-01-03 SDOH — ECONOMIC STABILITY: INCOME INSECURITY: HOW HARD IS IT FOR YOU TO PAY FOR THE VERY BASICS LIKE FOOD, HOUSING, MEDICAL CARE, AND HEATING?: NOT HARD AT ALL

## 2024-01-03 SDOH — ECONOMIC STABILITY: FOOD INSECURITY: WITHIN THE PAST 12 MONTHS, YOU WORRIED THAT YOUR FOOD WOULD RUN OUT BEFORE YOU GOT MONEY TO BUY MORE.: NEVER TRUE

## 2024-01-03 SDOH — ECONOMIC STABILITY: HOUSING INSECURITY
IN THE LAST 12 MONTHS, WAS THERE A TIME WHEN YOU DID NOT HAVE A STEADY PLACE TO SLEEP OR SLEPT IN A SHELTER (INCLUDING NOW)?: NO

## 2024-01-03 SDOH — ECONOMIC STABILITY: FOOD INSECURITY: WITHIN THE PAST 12 MONTHS, THE FOOD YOU BOUGHT JUST DIDN'T LAST AND YOU DIDN'T HAVE MONEY TO GET MORE.: NEVER TRUE

## 2024-01-03 ASSESSMENT — PATIENT HEALTH QUESTIONNAIRE - PHQ9
2. FEELING DOWN, DEPRESSED OR HOPELESS: 0
SUM OF ALL RESPONSES TO PHQ QUESTIONS 1-9: 0
SUM OF ALL RESPONSES TO PHQ QUESTIONS 1-9: 0
1. LITTLE INTEREST OR PLEASURE IN DOING THINGS: 0
SUM OF ALL RESPONSES TO PHQ9 QUESTIONS 1 & 2: 0
SUM OF ALL RESPONSES TO PHQ QUESTIONS 1-9: 0
SUM OF ALL RESPONSES TO PHQ9 QUESTIONS 1 & 2: 0
SUM OF ALL RESPONSES TO PHQ QUESTIONS 1-9: 0
2. FEELING DOWN, DEPRESSED OR HOPELESS: NOT AT ALL
1. LITTLE INTEREST OR PLEASURE IN DOING THINGS: NOT AT ALL

## 2024-01-04 ENCOUNTER — OFFICE VISIT (OUTPATIENT)
Dept: FAMILY MEDICINE CLINIC | Age: 59
End: 2024-01-04
Payer: COMMERCIAL

## 2024-01-04 VITALS
TEMPERATURE: 97.4 F | SYSTOLIC BLOOD PRESSURE: 130 MMHG | OXYGEN SATURATION: 95 % | HEART RATE: 79 BPM | DIASTOLIC BLOOD PRESSURE: 80 MMHG | WEIGHT: 304.6 LBS | HEIGHT: 69 IN | BODY MASS INDEX: 45.11 KG/M2 | RESPIRATION RATE: 14 BRPM

## 2024-01-04 DIAGNOSIS — Z00.00 WELL ADULT EXAM: Primary | ICD-10-CM

## 2024-01-04 DIAGNOSIS — R73.01 IFG (IMPAIRED FASTING GLUCOSE): ICD-10-CM

## 2024-01-04 DIAGNOSIS — E79.0 ELEVATED URIC ACID IN BLOOD: ICD-10-CM

## 2024-01-04 DIAGNOSIS — E78.5 HYPERLIPIDEMIA, UNSPECIFIED HYPERLIPIDEMIA TYPE: ICD-10-CM

## 2024-01-04 DIAGNOSIS — R74.8 ELEVATED ALKALINE PHOSPHATASE LEVEL: ICD-10-CM

## 2024-01-04 PROCEDURE — 99396 PREV VISIT EST AGE 40-64: CPT | Performed by: FAMILY MEDICINE

## 2024-01-04 RX ORDER — ATORVASTATIN CALCIUM 20 MG/1
20 TABLET, FILM COATED ORAL DAILY
Qty: 90 TABLET | Refills: 3 | Status: SHIPPED | OUTPATIENT
Start: 2024-01-04

## 2024-01-04 ASSESSMENT — ENCOUNTER SYMPTOMS
ABDOMINAL PAIN: 0
DIARRHEA: 0
ANAL BLEEDING: 0
NAUSEA: 0
BLOOD IN STOOL: 0
CHEST TIGHTNESS: 0
VOMITING: 0
SHORTNESS OF BREATH: 0
CONSTIPATION: 0

## 2024-01-04 NOTE — PATIENT INSTRUCTIONS
Remember the ISMAEL HAD A LITTLE SR PRINCIPLE- \"wherever your weight goes, your Blood Pressure, Cholesterol, and Sugar are sure to follow\"   After discussion with pt, will hold on checking sugars regularly, as well as [facility] referral and instead, continue to work on diet, exercise, and weight loss.   Check HGA1c, FLP,  CMP, ALK PHOS ISOENZYMES, and SPOT MA in 6 months.   Continue current meds otherwise  Refills   Follow up in 6 months on Thursday afternoon with resident physician.    Follow up in 12 months with me.           Preventive Health Topics (updated 1/4/2024):  Encouraged COVID VACCINE booster- pt declines   Encouraged annual FLU VACCINE- pt declines   Encouraged SHINGLES SHOT (SHINGRIX) - check with insurance re coverage and location of administration.  COLONOSCOPY done 8/10/2018 per Dr. Garduno- to follow up again in 2028.

## 2024-01-23 ENCOUNTER — HOSPITAL ENCOUNTER (OUTPATIENT)
Age: 59
Discharge: HOME | End: 2024-01-23
Payer: COMMERCIAL

## 2024-01-23 ENCOUNTER — HOSPITAL ENCOUNTER (OUTPATIENT)
Dept: GENERAL RADIOLOGY | Age: 59
Discharge: HOME | End: 2024-01-23
Payer: COMMERCIAL

## 2024-01-23 ENCOUNTER — OFFICE VISIT (OUTPATIENT)
Dept: FAMILY MEDICINE CLINIC | Age: 59
End: 2024-01-23
Payer: COMMERCIAL

## 2024-01-23 VITALS
SYSTOLIC BLOOD PRESSURE: 134 MMHG | OXYGEN SATURATION: 98 % | HEIGHT: 69 IN | BODY MASS INDEX: 45.03 KG/M2 | DIASTOLIC BLOOD PRESSURE: 86 MMHG | TEMPERATURE: 98.3 F | HEART RATE: 75 BPM | WEIGHT: 304 LBS | RESPIRATION RATE: 20 BRPM

## 2024-01-23 DIAGNOSIS — J02.9 SORE THROAT: ICD-10-CM

## 2024-01-23 DIAGNOSIS — R05.1 ACUTE COUGH: ICD-10-CM

## 2024-01-23 DIAGNOSIS — R05.1 ACUTE COUGH: Primary | ICD-10-CM

## 2024-01-23 LAB
INFLUENZA A ANTIBODY: NEGATIVE
INFLUENZA B ANTIBODY: NEGATIVE
Lab: NORMAL
QC PASS/FAIL: NORMAL
SARS-COV-2 RDRP RESP QL NAA+PROBE: NEGATIVE

## 2024-01-23 PROCEDURE — 87635 SARS-COV-2 COVID-19 AMP PRB: CPT | Performed by: FAMILY MEDICINE

## 2024-01-23 PROCEDURE — 71046 X-RAY EXAM CHEST 2 VIEWS: CPT

## 2024-01-23 PROCEDURE — 87804 INFLUENZA ASSAY W/OPTIC: CPT | Performed by: FAMILY MEDICINE

## 2024-01-23 PROCEDURE — 99213 OFFICE O/P EST LOW 20 MIN: CPT | Performed by: STUDENT IN AN ORGANIZED HEALTH CARE EDUCATION/TRAINING PROGRAM

## 2024-01-23 RX ORDER — BENZONATATE 200 MG/1
200 CAPSULE ORAL 3 TIMES DAILY PRN
Qty: 30 CAPSULE | Refills: 0 | Status: SHIPPED | OUTPATIENT
Start: 2024-01-23 | End: 2024-02-02

## 2024-01-23 RX ORDER — LORATADINE 10 MG
1 CAPSULE ORAL 2 TIMES DAILY PRN
Qty: 60 CAPSULE | Refills: 0 | Status: SHIPPED | OUTPATIENT
Start: 2024-01-23

## 2024-01-23 ASSESSMENT — ENCOUNTER SYMPTOMS
VOICE CHANGE: 1
COUGH: 1
VOMITING: 0
FACIAL SWELLING: 0
SINUS PRESSURE: 0
RHINORRHEA: 1
DIARRHEA: 0
WHEEZING: 0
NAUSEA: 0
CONSTIPATION: 0
SHORTNESS OF BREATH: 1
TROUBLE SWALLOWING: 0
ABDOMINAL PAIN: 0
EYE PAIN: 0
SINUS PAIN: 0
CHEST TIGHTNESS: 0
EYE REDNESS: 0
SORE THROAT: 0

## 2024-01-23 NOTE — PROGRESS NOTES
[facility]  [address]  [facility] [address]  Dept: [phone]  Dept Fax: [phone]  Loc: [phone]  Resident Note    Assessment & Plan:    1. Acute cough    2. Sore throat       Orders Placed This Encounter    XR CHEST STANDARD (2 VW)     Standing Status:   Future     Number of Occurrences:   1     Standing Expiration Date:   1/23/2025     Order Specific Question:   Reason for exam:     Answer:   acute productive cough    POCT COVID-19 Rapid, NAAT     Order Specific Question:   Is this test for diagnosis or screening?     Answer:   Diagnosis of ill patient     Order Specific Question:   Symptomatic for COVID-19 as defined by CDC?     Answer:   Yes     Order Specific Question:   Date of Symptom Onset     Answer:   1/16/2024     Order Specific Question:   Hospitalized for COVID-19?     Answer:   No     Order Specific Question:   Admitted to ICU for COVID-19?     Answer:   No     Order Specific Question:   Employed in healthcare setting?     Answer:   No     Order Specific Question:   Resident in a congregate (group) care setting?     Answer:   No     Order Specific Question:   Previously tested for COVID-19?     Answer:   Yes     Order Specific Question:   Pregnant:     Answer:   No    POCT Influenza A/B    benzonatate (TESSALON) 200 MG capsule     Sig: Take 1 capsule by mouth 3 times daily as needed for Cough     Dispense:  30 capsule     Refill:  0    Benzocaine 15 MG LOZG     Sig: Take 1 lozenge by mouth every 2 hours as needed (for sore throat) Do not take more than every 2 hours.     Dispense:  18 lozenge     Refill:  1    Dextromethorphan-guaiFENesin (CORICIDIN HBP CONGESTION/COUGH)  MG CAPS     Sig: Take 1 capsule by mouth 2 times daily as needed (cough/congestion)     Dispense:  60 capsule     Refill:  0     Acute viral cough  - for the past week  - has runny nose and sneezing  - cough is worsening and

## 2024-01-23 NOTE — PROGRESS NOTES
S: 58 y.o. male with   Chief Complaint   Patient presents with    Cough     For a week, has tried OTC with   Nasal drainage, tickle in throat, denies fever or known exposure        Chief complaint, [place], and all pertinent details of the case reviewed with the resident.    Please see resident's note for specific details discussed at today's visit.    BP Readings from Last 3 Encounters:   01/23/24 134/86   01/04/24 130/80   01/17/23 (!) 161/101     Wt Readings from Last 3 Encounters:   01/23/24 (!) 137.9 kg (304 lb)   01/04/24 (!) 138.2 kg (304 lb 9.6 oz)   01/17/23 124.7 kg (275 lb)       O: VS:  height is 1.753 m (5' 9\") and weight is 137.9 kg (304 lb) (abnormal). His oral temperature is 98.3 °F (36.8 °C). His blood pressure is 134/86 and his pulse is 75. His respiration is 20 and oxygen saturation is 98%.   A:     Diagnosis Orders   1. Acute cough  POCT COVID-19 Rapid, NAAT    POCT Influenza A/B    XR CHEST STANDARD (2 VW)    benzonatate (TESSALON) 200 MG capsule    Benzocaine 15 MG LOZG    Dextromethorphan-guaiFENesin (CORICIDIN HBP CONGESTION/COUGH)  MG CAPS      2. Sore throat  Benzocaine 15 MG LOZG          Plan:  Tessalon, benzocaien lozenges, coricidin hcp  CXR  Let us know if worsens    Health Maintenance Due   Topic Date Due    Shingles vaccine (1 of 2) Never done       Attending Physician Statement  I have discussed the case, including pertinent history and exam findings with the resident.  I agree with the documented assessment and plan as documented by the resident.        Bhavna Jacobs MD 1/23/2024 11:58 AM

## 2024-01-23 NOTE — PROGRESS NOTES
[facility]  [address]  [facility] [address]  Dept: [phone]  Dept Fax: [phone]    CARE VISIT   Patient Care Team:  Kang Head MD as PCP - General (Family Medicine)  Kang Head MD as PCP - Empaneled Provider    YOB: 1965  Date of Service:  1/23/2024    SUBJECTIVE     Conrado Guerra is a 58 y.o. male who presents for     Chief Complaint   Patient presents with    Cough     For a week, has tried OTC with   Nasal drainage, tickle in throat, denies fever or known exposure        Started one week ago with a chest cough, no runny nose at the time.   Progressed to now has runny nose, blowing it often. Cough is set off when he lays down, recliner, needs to stand or sit in a chair upright.   Sore throat worsens when he inhales and then starts a coughing fit. Can produce mucous when coughing now. Clear to yellow in color. Thick mucous.   Denies fevers chills, no n/v/d.  No muscle ache or joint pain.   Tried 3 different OTC bottles - mucinex, robitussin, telsum. Tried Nyquil once.  Motrin for headache. Minimal improvement with them.   No known sick contacts.   Covid/Flu Negative in office today.     Had COVID in Nov 2021.   No flu shot. 3 rounds of COVID vaccines historically.         Patient Active Problem List   Diagnosis    Hyperlipidemia    IFG (impaired fasting glucose)    H/O: gout         No Known Allergies      Prior to Visit Medications    Medication Sig Taking? Authorizing Provider   atorvastatin (LIPITOR) 20 MG tablet Take 1 tablet by mouth daily Yes Kang Head MD   ibuprofen (ADVIL;MOTRIN) 200 MG tablet Take 1 tablet by mouth every 6 hours as needed for Pain Yes Provider, MD Rambo       Past Medical History:   Diagnosis Date    History of kidney stones 2014    Dr Sifuentes    Hyperlipidemia     Kidney stone        Past Surgical History:   Procedure Laterality Date    ARM SURGERY Left as a child     compound

## 2024-01-24 ASSESSMENT — ENCOUNTER SYMPTOMS
SHORTNESS OF BREATH: 0
COUGH: 1
EYE DISCHARGE: 0
SORE THROAT: 0
RHINORRHEA: 1
ABDOMINAL PAIN: 0

## 2024-06-26 LAB
ALBUMIN: 4.2 G/DL (ref 3.5–5.2)
ALK PHOSPHATASE: 143 U/L (ref 40–123)
ALT SERPL-CCNC: 28 U/L (ref 5–50)
ANION GAP SERPL CALCULATED.3IONS-SCNC: 9 MEQ/L (ref 7–16)
AST SERPL-CCNC: 24 U/L (ref 9–50)
BILIRUB SERPL-MCNC: 0.6 MG/DL
BUN BLDV-MCNC: 13 MG/DL (ref 6–20)
CALCIUM SERPL-MCNC: 9.4 MG/DL (ref 8.5–10.5)
CHLORIDE BLD-SCNC: 105 MEQ/L (ref 95–107)
CHOLESTEROL, TOTAL: 95 MG/DL
CHOLESTEROL/HDL RATIO: 2.9 RATIO
CO2: 27 MEQ/L (ref 19–31)
CREAT SERPL-MCNC: 1.14 MG/DL (ref 0.8–1.4)
CREATINE, URINE: 184 MG/DL
EGFR IF NONAFRICAN AMERICAN: 75 ML/MIN/1.73
ESTIMATED AVERAGE GLUCOSE: 120 MG/DL
GLUCOSE: 102 MG/DL (ref 70–99)
HBA1C MFR BLD: 5.8 % (ref 4.2–5.6)
HDLC SERPL-MCNC: 33 MG/DL
LDL CHOLESTEROL: 41 MG/DL
LDL/HDL RATIO: 1.2 RATIO
MICROALBUMIN/CREAT 24H UR: <1.2 MG/DL
MICROALBUMIN/CREAT UR-RTO: NORMAL MG/G
POTASSIUM SERPL-SCNC: 4.5 MEQ/L (ref 3.5–5.4)
SODIUM BLD-SCNC: 141 MEQ/L (ref 133–146)
TOTAL PROTEIN: 6.5 G/DL (ref 6.1–8.3)
TRIGL SERPL-MCNC: 105 MG/DL
VLDLC SERPL CALC-MCNC: 21 MG/DL

## 2024-06-28 LAB
ALKALINE PHOSPHATASE - LIVER 2,SER/PLAS,QN: 5.8 U/L (ref 0–9.6)
ALKALINE PHOSPHATASE BONE FRACTION: 28.4 % (ref 19.1–67.7)
ALKALINE PHOSPHATASE INTESTINE FRACTION: 0 % (ref 0–20.6)
ALKALINE PHOSPHATASE LIVER 1%: 67.3 % (ref 27.8–76.3)
ALKALINE PHOSPHATASE LIVER 1: 91.5 U/L (ref 11.1–91.6)
ALKALINE PHOSPHATASE LIVER 2%: 4.3 % (ref 0–8)
ALKALINE PHOSPHATASE PLACENTAL FRACTION: 0 %
ALKALINE PHOSPHATASE, INTESTINAL: 0 U/L (ref 0–24.7)
ALKALINE PHOSPHATASE, PLACENTAL: 0 U/L
ALP BLD-CCNC: 136 U/L (ref 40–120)
BONE ISOENZYME: 38.6 U/L (ref 7.6–81.2)

## 2024-07-01 ENCOUNTER — OFFICE VISIT (OUTPATIENT)
Dept: FAMILY MEDICINE CLINIC | Age: 59
End: 2024-07-01
Payer: COMMERCIAL

## 2024-07-01 VITALS
HEIGHT: 69 IN | WEIGHT: 296 LBS | HEART RATE: 70 BPM | BODY MASS INDEX: 43.84 KG/M2 | DIASTOLIC BLOOD PRESSURE: 86 MMHG | RESPIRATION RATE: 16 BRPM | OXYGEN SATURATION: 96 % | TEMPERATURE: 98.2 F | SYSTOLIC BLOOD PRESSURE: 122 MMHG

## 2024-07-01 DIAGNOSIS — E78.5 HYPERLIPIDEMIA, UNSPECIFIED HYPERLIPIDEMIA TYPE: Primary | ICD-10-CM

## 2024-07-01 DIAGNOSIS — Z23 NEED FOR SHINGLES VACCINE: ICD-10-CM

## 2024-07-01 DIAGNOSIS — E66.01 CLASS 3 SEVERE OBESITY DUE TO EXCESS CALORIES WITH BODY MASS INDEX (BMI) OF 40.0 TO 44.9 IN ADULT, UNSPECIFIED WHETHER SERIOUS COMORBIDITY PRESENT (HCC): ICD-10-CM

## 2024-07-01 DIAGNOSIS — Z12.5 SCREENING PSA (PROSTATE SPECIFIC ANTIGEN): ICD-10-CM

## 2024-07-01 DIAGNOSIS — R73.01 IFG (IMPAIRED FASTING GLUCOSE): ICD-10-CM

## 2024-07-01 PROBLEM — E66.813 CLASS 3 SEVERE OBESITY DUE TO EXCESS CALORIES WITH BODY MASS INDEX (BMI) OF 40.0 TO 44.9 IN ADULT: Status: ACTIVE | Noted: 2024-07-01

## 2024-07-01 PROCEDURE — 99213 OFFICE O/P EST LOW 20 MIN: CPT

## 2024-07-01 ASSESSMENT — ENCOUNTER SYMPTOMS
COUGH: 0
SHORTNESS OF BREATH: 0
ABDOMINAL PAIN: 0
WHEEZING: 0

## 2024-07-01 NOTE — PROGRESS NOTES
Patient:Conrado Guerra  YOB: 1965  MRN: [id number]    Subjective   59 y.o. male who presents for the following: Follow-up (6 month follow up. )    Hyperlipidemia  This is a chronic problem. The current episode started more than 1 year ago. The problem is controlled. Recent lipid tests were reviewed and are low. Exacerbating diseases include obesity. He has no history of chronic renal disease, diabetes or liver disease (Alk Phos has been elevated at times). There are no known factors aggravating his hyperlipidemia. Pertinent negatives include no chest pain, leg pain, myalgias or shortness of breath. Current antihyperlipidemic treatment includes diet change and statins. The current treatment provides significant improvement of lipids. There are no compliance problems.  Risk factors for coronary artery disease include male sex, obesity, dyslipidemia and family history.     Review of Systems   Review of Systems   Constitutional:  Negative for fatigue.   HENT:  Negative for congestion and sneezing.    Eyes:  Negative for visual disturbance.   Respiratory:  Negative for cough, shortness of breath and wheezing.    Cardiovascular:  Negative for chest pain.   Gastrointestinal:  Negative for abdominal pain.   Genitourinary:  Negative for difficulty urinating.   Musculoskeletal:  Negative for myalgias.   Skin:  Negative for rash and wound.   Neurological:  Negative for headaches.   Psychiatric/Behavioral:  Negative for behavioral problems and dysphoric mood. The patient is not nervous/anxious.      Patient History    Past Medical History:  He has a past medical history of History of kidney stones, Hyperlipidemia, and Kidney stone.    Social History:  He reports that he has never smoked. He has never used smokeless tobacco. He reports that he does not drink alcohol and does not use drugs.     Past Surgical History:   Procedure Laterality Date    ARM SURGERY Left as a child     compound fracture     COLONOSCOPY

## 2024-07-01 NOTE — PROGRESS NOTES
S: 59 y.o. male with   Chief Complaint   Patient presents with    Follow-up     6 month follow up.      High cholesterol on lipitor, doing well.  Low HDL noted.   Prediabetes -- improvement   BMI 43 improvement slowly   Working on lifestyle modifications -- decreasing sugar, calories and adding a bit more activity  BP is fine    Lab Results   Component Value Date    LABA1C 5.8 (H) 06/26/2024    LABA1C 6.0 (H) 12/27/2023    LABA1C 5.5 12/01/2022       BP Readings from Last 3 Encounters:   07/01/24 122/86   01/23/24 134/86   01/04/24 130/80     Wt Readings from Last 3 Encounters:   07/01/24 134.3 kg (296 lb)   01/23/24 (!) 137.9 kg (304 lb)   01/04/24 (!) 138.2 kg (304 lb 9.6 oz)       O: VS:   Vitals:    07/01/24 0804   BP: 122/86   Site: Left Upper Arm   Position: Sitting   Cuff Size: Medium Adult   Pulse: 70   Resp: 16   Temp: 98.2 °F (36.8 °C)   TempSrc: Oral   SpO2: 96%   Weight: 134.3 kg (296 lb)   Height: 1.753 m (5' 9\")     Body mass index is 43.71 kg/m².  Lab Results   Component Value Date    TSH 3.710 12/27/2023         Lab Results   Component Value Date    WBC 9.9 12/27/2023    HGB 16.1 12/27/2023    HCT 48.4 12/27/2023     12/27/2023    CHOL 95 06/26/2024    TRIG 105 06/26/2024    HDL 33 (L) 06/26/2024    LDLDIRECT 78 01/23/2020    LDL 41 06/26/2024    AST 24 06/26/2024     06/26/2024    K 4.5 06/26/2024     06/26/2024    CREATININE 1.14 06/26/2024    BUN 13 06/26/2024    CO2 27 06/26/2024    TSH 3.710 12/27/2023    LABA1C 5.8 (H) 06/26/2024    LABGLOM 65 (A) 06/22/2014    CALCIUM 9.4 06/26/2024       No results found.    Family History   Problem Relation Age of Onset    Hypertension Mother     Hypertension Father     Heart Disease Father     Heart Surgery Father           Diagnosis Orders   1. Hyperlipidemia, unspecified hyperlipidemia type        2. IFG (impaired fasting glucose)        3. Need for shingles vaccine        4. Class 3 severe obesity due to excess calories with body mass

## 2024-07-03 NOTE — ASSESSMENT & PLAN NOTE
Chronic condition. The patient is asked to make an attempt to improve diet and exercise patterns to aid in management of this problem.

## 2024-07-03 NOTE — ASSESSMENT & PLAN NOTE
Stable, chronic condition near goal on current regimen; will continue current therapies (lipitor 20 mg/day).

## 2024-11-05 ENCOUNTER — OFFICE VISIT (OUTPATIENT)
Dept: FAMILY MEDICINE CLINIC | Age: 59
End: 2024-11-05

## 2024-11-05 VITALS
HEART RATE: 78 BPM | SYSTOLIC BLOOD PRESSURE: 162 MMHG | WEIGHT: 294.4 LBS | TEMPERATURE: 98.1 F | DIASTOLIC BLOOD PRESSURE: 96 MMHG | OXYGEN SATURATION: 95 % | BODY MASS INDEX: 43.6 KG/M2 | HEIGHT: 69 IN | RESPIRATION RATE: 16 BRPM

## 2024-11-05 DIAGNOSIS — I10 HYPERTENSION, UNSPECIFIED TYPE: Primary | ICD-10-CM

## 2024-11-05 RX ORDER — LISINOPRIL 10 MG/1
10 TABLET ORAL DAILY
Qty: 30 TABLET | Refills: 3 | Status: SHIPPED | OUTPATIENT
Start: 2024-11-05

## 2024-11-05 RX ORDER — NAPROXEN SODIUM 220 MG/1
220 TABLET, FILM COATED ORAL 2 TIMES DAILY WITH MEALS
COMMUNITY

## 2024-11-05 ASSESSMENT — ENCOUNTER SYMPTOMS
NAUSEA: 0
VOMITING: 0
CONSTIPATION: 0
DIARRHEA: 0
SHORTNESS OF BREATH: 0
BLOOD IN STOOL: 0

## 2024-11-05 NOTE — PROGRESS NOTES
[facility]  [address]  [facility] [address]  Dept: [phone]  Dept Fax: [phone]  Loc: [phone]      Conrado Guerra is a 59 y.o. male who presents today for:  Chief Complaint   Patient presents with    Hypertension     High Blood Pressure readings 170 and 180 over 100       HPI:   Conrado Guerra is 59 y.o. presents today for acute care for elevated BP  Pmh: HLD on lipitor   Pt states that he went to see his chiropractor for his back where he was told that his BP was elevated. Since then patient has been measuring his BP at home and his readings are as follows:   Friday: 182/98 AM, 157/99 PM  Sunday: 178/100 PM  Monday: 155/101 AM, 178/101 PM  Tuesday: 157/94 AM  States that the chiropractor has a nutritionist that has been helping him out and he has been carb counting and trying to increase the amount of fruits and vegetables in his diet since then. Does admit that his diet is poor, and he has poor portion control. Admits to drinking ~4, 20 ounce bottles of water daily. Denies alcohol use. States that he drinks soda but has since transitioned to zero soda since being seen by the nutritionist. Denies any current forms of exercise.     Objective:     Vitals:    11/05/24 1105   BP: (!) 162/96   Pulse:    Resp:    Temp:    SpO2:          Wt Readings from Last 3 Encounters:   11/05/24 133.5 kg (294 lb 6.4 oz)   07/01/24 134.3 kg (296 lb)   01/23/24 (!) 137.9 kg (304 lb)       BP Readings from Last 3 Encounters:   11/05/24 (!) 162/96   07/01/24 122/86   01/23/24 134/86       Lab Results   Component Value Date    WBC 9.9 12/27/2023    HGB 16.1 12/27/2023    HCT 48.4 12/27/2023    MCV 85.2 12/27/2023     12/27/2023     Lab Results   Component Value Date     06/26/2024    K 4.5 06/26/2024     06/26/2024    CO2 27 06/26/2024    BUN 13 06/26/2024    CREATININE 1.14 06/26/2024    GLUCOSE 102 (H) 06/26/2024

## 2024-11-05 NOTE — PROGRESS NOTES
Health Maintenance Due   Topic Date Due    Flu vaccine (1) Never done    COVID-19 Vaccine (4 - 2023-24 season) 09/01/2024

## 2024-11-05 NOTE — PROGRESS NOTES
S: 59 y.o. male with   Chief Complaint   Patient presents with    Hypertension     High Blood Pressure readings 170 and 180 over 100     High BP lately, noted at chiropractor.  Asymptomatic   H/o high cholesterol, treated.   Alcohol? Doesn't drink  Caffeine? Working on decreasing by ridding of pop  Nsaids? Rare intake. CTS much better.   Overall admits to poor diet and not active. Nutritionist at chiropractor's office working with patient. He is involving wife as well.    BP Readings from Last 3 Encounters:   11/05/24 (!) 162/96   07/01/24 122/86   01/23/24 134/86     Wt Readings from Last 3 Encounters:   11/05/24 133.5 kg (294 lb 6.4 oz)   07/01/24 134.3 kg (296 lb)   01/23/24 (!) 137.9 kg (304 lb)       O: VS:   Vitals:    11/05/24 1055 11/05/24 1105   BP: (!) 170/100 (!) 162/96   Site: Left Upper Arm Left Upper Arm   Position: Sitting Sitting   Cuff Size: Large Adult Large Adult   Pulse: 78    Resp: 16    Temp: 98.1 °F (36.7 °C)    TempSrc: Oral    SpO2: 95%    Weight: 133.5 kg (294 lb 6.4 oz)    Height: 1.753 m (5' 9.02\")      Body mass index is 43.46 kg/m².    AAO/NAD, appropriate affect for mood  Normocephalic, atraumatic, eyes - conjunctiva and sclera normal,   skin no rashes on exposed areas   Insight, judgement normal and in no acute distress      Lab Results   Component Value Date    WBC 9.9 12/27/2023    HGB 16.1 12/27/2023    HCT 48.4 12/27/2023     12/27/2023    CHOL 95 06/26/2024    TRIG 105 06/26/2024    HDL 33 (L) 06/26/2024    LDLDIRECT 78 01/23/2020    LDL 41 06/26/2024    AST 24 06/26/2024     06/26/2024    K 4.5 06/26/2024     06/26/2024    CREATININE 1.14 06/26/2024    BUN 13 06/26/2024    CO2 27 06/26/2024    TSH 3.710 12/27/2023    LABA1C 5.8 (H) 06/26/2024    LABGLOM 65 (A) 06/22/2014    CALCIUM 9.4 06/26/2024       No results found.         Diagnosis Orders   1. Hypertension, unspecified type            Plan    Agree with lisinopril start.   BMP in 2-3 week  Will need f/u

## 2024-11-05 NOTE — PATIENT INSTRUCTIONS
Thank you   Thank you for trusting us with your healthcare needs. You may receive a survey regarding today's visit. It would help us out if you would take a few moments to provide your feedback. We value your input.  Please bring in ALL medications BOTTLES, including inhalers, herbal supplements, over the counter, prescribed & non-prescribed medicine. The office would like actual medication bottles and a list.         4.  Prior to getting your labs drawn, check with your insurance company for benefits and eligibility of lab services.  Often, insurance companies cover certain tests for preventative visits only.  It is patient's responsibility to    see what is covered.    5.  If the list below has been completed, PLEASE FAX RECORDS TO OUR OFFICE @ [phone]. Once the records have been received we will update your records at our office:  Health Maintenance Due   Topic Date Due    Flu vaccine (1) Never done    COVID-19 Vaccine (4 - 2023-24 season) 09/01/2024

## 2024-11-25 ENCOUNTER — HOSPITAL ENCOUNTER (OUTPATIENT)
Age: 59
Discharge: HOME | End: 2024-11-25
Payer: COMMERCIAL

## 2024-11-25 LAB
EKG ATRIAL RATE: 54 BPM
EKG P AXIS: 10 DEGREES
EKG P-R INTERVAL: 176 MS
EKG Q-T INTERVAL: 408 MS
EKG QRS DURATION: 84 MS
EKG QTC CALCULATION (BAZETT): 386 MS
EKG R AXIS: -3 DEGREES
EKG T AXIS: 12 DEGREES
EKG VENTRICULAR RATE: 54 BPM

## 2024-11-25 PROCEDURE — 93005 ELECTROCARDIOGRAM TRACING: CPT

## 2024-11-25 PROCEDURE — 93010 ELECTROCARDIOGRAM REPORT: CPT | Performed by: INTERNAL MEDICINE

## 2024-11-26 LAB — MICROSCOPIC URINE: NORMAL

## 2024-12-04 ENCOUNTER — OFFICE VISIT (OUTPATIENT)
Dept: FAMILY MEDICINE CLINIC | Age: 59
End: 2024-12-04

## 2024-12-04 VITALS
RESPIRATION RATE: 12 BRPM | SYSTOLIC BLOOD PRESSURE: 150 MMHG | BODY MASS INDEX: 42.51 KG/M2 | HEIGHT: 69 IN | WEIGHT: 287 LBS | OXYGEN SATURATION: 98 % | DIASTOLIC BLOOD PRESSURE: 90 MMHG | HEART RATE: 67 BPM | TEMPERATURE: 98.1 F

## 2024-12-04 DIAGNOSIS — I10 HYPERTENSION, UNSPECIFIED TYPE: Primary | ICD-10-CM

## 2024-12-04 RX ORDER — LISINOPRIL 20 MG/1
20 TABLET ORAL DAILY
Qty: 30 TABLET | Refills: 0 | Status: SHIPPED | OUTPATIENT
Start: 2024-12-04 | End: 2025-01-03

## 2024-12-04 ASSESSMENT — ENCOUNTER SYMPTOMS
NAUSEA: 0
SHORTNESS OF BREATH: 0
ABDOMINAL PAIN: 0
VOMITING: 0

## 2024-12-04 NOTE — PROGRESS NOTES
[facility]  [address]  [place] [id number]  Dept: [phone]  Loc: [phone]        Conrado Guerra is a 59 y.o. male who presents today for:  Chief Complaint   Patient presents with    1 Month Follow-Up     HTN       HPI:   Conrado Guerra is 59 y.o. presents today for follow up  Last seen 11/5  Pt states that since then, he has been doing good. Is measuring his BP at home once daily and they have been running 140-150/90. Admits that he has been working on diet and is doing portion control along with low carb diet and has been successful in losing weight. Denies any alcohol use. Does admit to following a nutrionist / chiropractor   States that [facility].   Declines covid and flu vaccine this season   Has no other questions or concerns at this time     Objective:     Vitals:    12/04/24 1546   BP: (!) 150/90   Pulse:    Resp:    Temp:    SpO2:          Wt Readings from Last 3 Encounters:   12/04/24 130.2 kg (287 lb)   11/05/24 133.5 kg (294 lb 6.4 oz)   07/01/24 134.3 kg (296 lb)       BP Readings from Last 3 Encounters:   12/04/24 (!) 150/90   11/05/24 (!) 162/96   07/01/24 122/86       Lab Results   Component Value Date    WBC 9.9 12/27/2023    HGB 16.1 12/27/2023    HCT 48.4 12/27/2023    MCV 85.2 12/27/2023     12/27/2023     Lab Results   Component Value Date     06/26/2024    K 4.5 06/26/2024     06/26/2024    CO2 27 06/26/2024    BUN 13 06/26/2024    CREATININE 1.14 06/26/2024    GLUCOSE 102 (H) 06/26/2024    CALCIUM 9.4 06/26/2024    BILITOT 0.6 06/26/2024    ALKPHOS 143 (H) 06/26/2024    ALKPHOS 136 (H) 06/26/2024    ALKPHOS 0.0 06/26/2024    AST 24 06/26/2024    ALT 28 06/26/2024    LABGLOM 65 (A) 06/22/2014     Lab Results   Component Value Date    TSH 3.710 12/27/2023     Lab Results   Component Value Date    LABA1C 5.8 (H) 06/26/2024     Lab Results   Component Value Date    EAG

## 2024-12-04 NOTE — PROGRESS NOTES
After pharmacist chart review, the following recommendations are made:  - May consider increasing Lisinopril 10 mg to Lisinopril 20 mg if blood pressure still not at goal     Coby Cody, PharmD  PGY1 Pharmacy Resident  12/4/2024 2:55 PM

## 2024-12-04 NOTE — PATIENT INSTRUCTIONS
Thank you   Thank you for trusting us with your healthcare needs. You may receive a survey regarding today's visit. It would help us out if you would take a few moments to provide your feedback. We value your input.  Please bring in ALL medications BOTTLES, including inhalers, herbal supplements, over the counter, prescribed & non-prescribed medicine. The office would like actual medication bottles and a list.         4.  Prior to getting your labs drawn, check with your insurance company for benefits and eligibility of lab services.  Often, insurance companies cover certain tests for preventative visits only.  It is patient's responsibility to    see what is covered.    5.  If the list below has been completed, PLEASE FAX RECORDS TO OUR OFFICE @ [phone]. Once the records have been received we will update your records at our office:  Health Maintenance Due   Topic Date Due    Flu vaccine (1) Never done    COVID-19 Vaccine (4 - 2023-24 season) 09/01/2024    Depression Screen  01/03/2025

## 2024-12-04 NOTE — PROGRESS NOTES
Health Maintenance Due   Topic Date Due    Flu vaccine (1) Never done    COVID-19 Vaccine (4 - 2023-24 season) 09/01/2024    Depression Screen  01/03/2025

## 2024-12-04 NOTE — PROGRESS NOTES
Attending Physician Note    I have seen and evaluated the patient. I discussed the findings, assessment and plan with the resident and agree with the resident's findings and plan as documented in the resident's note.  GC modifier added.    Brief summary:  HTN - BP not at goal.  Continue lifestyle changes and increase lisinopril to 20mg daily.

## 2024-12-11 NOTE — PROGRESS NOTES
Flu vaccine (1) 01/09/2026 (Originally 8/1/2024)    COVID-19 Vaccine (4 - 2023-24 season) 01/09/2026 (Originally 9/1/2024)    A1C test (Diabetic or Prediabetic)  01/02/2026    Lipids  01/02/2026    Depression Screen  01/08/2026    Colorectal Cancer Screen  08/10/2028    DTaP/Tdap/Td vaccine (2 - Td or Tdap) 02/20/2033    Hepatitis A vaccine  Aged Out    Hib vaccine  Aged Out    Polio vaccine  Aged Out    Meningococcal (ACWY) vaccine  Aged Out    Pneumococcal 0-64 years Vaccine  Aged Out    Hepatitis C screen  Discontinued    HIV screen  Discontinued    Prostate Specific Antigen (PSA) Screening or Monitoring  Discontinued       Tobacco Use: Low Risk  (1/9/2025)    Patient History     Smoking Tobacco Use: Never     Smokeless Tobacco Use: Never     Passive Exposure: Never     (updated 1/9/2025)   CT Lung Screen (50-80, 20 pk-yrs, smoking or quit <15 years) indicated at this time?  No tobacco history  Sleep Medicine referral indicated at this time (Obesity, Snoring, Daytime Somnolence, Apneic Episodes)?  Patient denies any current symptoms      No future appointments.        ASSESSMENT       Diagnosis Orders   1. Well adult exam        2. IFG (impaired fasting glucose)  Hemoglobin A1C    Microalbumin / Creatinine Urine Ratio      3. Hypertension, unspecified type  Microalbumin / Creatinine Urine Ratio    Basic Metabolic Panel      4. Hyperlipidemia, unspecified hyperlipidemia type  atorvastatin (LIPITOR) 20 MG tablet      5. Class 3 severe obesity due to excess calories with body mass index (BMI) of 40.0 to 44.9 in adult, unspecified whether serious comorbidity present        6. H/O: gout        7. Increased prostate specific antigen (PSA) velocity  PSA, Prostatic Specific Antigen          PLAN      Continue to work on diet, exercise, and weight loss.   After discussion with pt, will change Lisinopril 20 mg daily to Lisinopril HCT 20/12.5- 1 pill daily. #30/5   Recheck BMP in 1 month.   Home BP's- 3x/week for 3

## 2025-01-02 ENCOUNTER — LAB (OUTPATIENT)
Dept: LAB | Age: 60
End: 2025-01-02

## 2025-01-02 DIAGNOSIS — E66.813 CLASS 3 SEVERE OBESITY DUE TO EXCESS CALORIES WITH BODY MASS INDEX (BMI) OF 40.0 TO 44.9 IN ADULT, UNSPECIFIED WHETHER SERIOUS COMORBIDITY PRESENT: ICD-10-CM

## 2025-01-02 DIAGNOSIS — R73.01 IFG (IMPAIRED FASTING GLUCOSE): ICD-10-CM

## 2025-01-02 DIAGNOSIS — E78.5 HYPERLIPIDEMIA, UNSPECIFIED HYPERLIPIDEMIA TYPE: ICD-10-CM

## 2025-01-02 DIAGNOSIS — Z12.5 SCREENING PSA (PROSTATE SPECIFIC ANTIGEN): ICD-10-CM

## 2025-01-02 DIAGNOSIS — E66.01 CLASS 3 SEVERE OBESITY DUE TO EXCESS CALORIES WITH BODY MASS INDEX (BMI) OF 40.0 TO 44.9 IN ADULT, UNSPECIFIED WHETHER SERIOUS COMORBIDITY PRESENT: ICD-10-CM

## 2025-01-02 LAB
ALBUMIN SERPL BCG-MCNC: 4.2 G/DL (ref 3.5–5.1)
ALP SERPL-CCNC: 125 U/L (ref 38–126)
ALT SERPL W/O P-5'-P-CCNC: 22 U/L (ref 11–66)
ANION GAP SERPL CALC-SCNC: 12 MEQ/L (ref 8–16)
AST SERPL-CCNC: 18 U/L (ref 5–40)
BASOPHILS ABSOLUTE: 0 THOU/MM3 (ref 0–0.1)
BASOPHILS NFR BLD AUTO: 0.6 %
BILIRUB SERPL-MCNC: 0.4 MG/DL (ref 0.3–1.2)
BUN SERPL-MCNC: 13 MG/DL (ref 7–22)
CALCIUM SERPL-MCNC: 9.1 MG/DL (ref 8.5–10.5)
CHLORIDE SERPL-SCNC: 107 MEQ/L (ref 98–111)
CHOLESTEROL, FASTING: 104 MG/DL (ref 100–199)
CO2 SERPL-SCNC: 25 MEQ/L (ref 23–33)
CREAT SERPL-MCNC: 1 MG/DL (ref 0.4–1.2)
DEPRECATED MEAN GLUCOSE BLD GHB EST-ACNC: 120 MG/DL (ref 70–126)
DEPRECATED RDW RBC AUTO: 43.4 FL (ref 35–45)
EOSINOPHIL NFR BLD AUTO: 0.8 %
EOSINOPHILS ABSOLUTE: 0.1 THOU/MM3 (ref 0–0.4)
ERYTHROCYTE [DISTWIDTH] IN BLOOD BY AUTOMATED COUNT: 13.3 % (ref 11.5–14.5)
GFR SERPL CREATININE-BSD FRML MDRD: 87 ML/MIN/1.73M2
GLUCOSE FASTING: 103 MG/DL (ref 70–108)
HBA1C MFR BLD HPLC: 6 % (ref 4.4–6.4)
HCT VFR BLD AUTO: 48.7 % (ref 42–52)
HDLC SERPL-MCNC: 34 MG/DL
HGB BLD-MCNC: 15.6 GM/DL (ref 14–18)
IMM GRANULOCYTES # BLD AUTO: 0.03 THOU/MM3 (ref 0–0.07)
IMM GRANULOCYTES NFR BLD AUTO: 0.4 %
LDLC SERPL CALC-MCNC: 50 MG/DL
LYMPHOCYTES ABSOLUTE: 1.7 THOU/MM3 (ref 1–4.8)
LYMPHOCYTES NFR BLD AUTO: 21 %
MCH RBC QN AUTO: 28.6 PG (ref 26–33)
MCHC RBC AUTO-ENTMCNC: 32 GM/DL (ref 32.2–35.5)
MCV RBC AUTO: 89.2 FL (ref 80–94)
MONOCYTES ABSOLUTE: 0.6 THOU/MM3 (ref 0.4–1.3)
MONOCYTES NFR BLD AUTO: 7.5 %
NEUTROPHILS ABSOLUTE: 5.8 THOU/MM3 (ref 1.8–7.7)
NEUTROPHILS NFR BLD AUTO: 69.7 %
NRBC BLD AUTO-RTO: 0 /100 WBC
PLATELET # BLD AUTO: 234 THOU/MM3 (ref 130–400)
PMV BLD AUTO: 11.5 FL (ref 9.4–12.4)
POTASSIUM SERPL-SCNC: 3.7 MEQ/L (ref 3.5–5.2)
PROT SERPL-MCNC: 6.8 G/DL (ref 6.1–8)
PSA SERPL-MCNC: 0.89 NG/ML (ref 0–1)
RBC # BLD AUTO: 5.46 MILL/MM3 (ref 4.7–6.1)
SODIUM SERPL-SCNC: 144 MEQ/L (ref 135–145)
TRIGLYCERIDE, FASTING: 99 MG/DL (ref 0–199)
TSH SERPL DL<=0.005 MIU/L-ACNC: 1.65 UIU/ML (ref 0.4–4.2)
WBC # BLD AUTO: 8.3 THOU/MM3 (ref 4.8–10.8)

## 2025-01-08 ASSESSMENT — PATIENT HEALTH QUESTIONNAIRE - PHQ9
SUM OF ALL RESPONSES TO PHQ QUESTIONS 1-9: 0
1. LITTLE INTEREST OR PLEASURE IN DOING THINGS: NOT AT ALL
SUM OF ALL RESPONSES TO PHQ QUESTIONS 1-9: 0
2. FEELING DOWN, DEPRESSED OR HOPELESS: NOT AT ALL
2. FEELING DOWN, DEPRESSED OR HOPELESS: NOT AT ALL
SUM OF ALL RESPONSES TO PHQ QUESTIONS 1-9: 0
SUM OF ALL RESPONSES TO PHQ9 QUESTIONS 1 & 2: 0
SUM OF ALL RESPONSES TO PHQ QUESTIONS 1-9: 0
SUM OF ALL RESPONSES TO PHQ9 QUESTIONS 1 & 2: 0
1. LITTLE INTEREST OR PLEASURE IN DOING THINGS: NOT AT ALL

## 2025-01-09 ENCOUNTER — OFFICE VISIT (OUTPATIENT)
Dept: FAMILY MEDICINE CLINIC | Age: 60
End: 2025-01-09
Payer: COMMERCIAL

## 2025-01-09 VITALS
HEIGHT: 69 IN | RESPIRATION RATE: 18 BRPM | DIASTOLIC BLOOD PRESSURE: 80 MMHG | TEMPERATURE: 98.2 F | HEART RATE: 59 BPM | OXYGEN SATURATION: 98 % | WEIGHT: 288 LBS | BODY MASS INDEX: 42.65 KG/M2 | SYSTOLIC BLOOD PRESSURE: 138 MMHG

## 2025-01-09 DIAGNOSIS — R73.01 IFG (IMPAIRED FASTING GLUCOSE): ICD-10-CM

## 2025-01-09 DIAGNOSIS — E66.01 CLASS 3 SEVERE OBESITY DUE TO EXCESS CALORIES WITH BODY MASS INDEX (BMI) OF 40.0 TO 44.9 IN ADULT, UNSPECIFIED WHETHER SERIOUS COMORBIDITY PRESENT: ICD-10-CM

## 2025-01-09 DIAGNOSIS — E78.5 HYPERLIPIDEMIA, UNSPECIFIED HYPERLIPIDEMIA TYPE: ICD-10-CM

## 2025-01-09 DIAGNOSIS — Z87.39 H/O: GOUT: ICD-10-CM

## 2025-01-09 DIAGNOSIS — Z00.00 WELL ADULT EXAM: Primary | ICD-10-CM

## 2025-01-09 DIAGNOSIS — R97.20 INCREASED PROSTATE SPECIFIC ANTIGEN (PSA) VELOCITY: ICD-10-CM

## 2025-01-09 DIAGNOSIS — E66.813 CLASS 3 SEVERE OBESITY DUE TO EXCESS CALORIES WITH BODY MASS INDEX (BMI) OF 40.0 TO 44.9 IN ADULT, UNSPECIFIED WHETHER SERIOUS COMORBIDITY PRESENT: ICD-10-CM

## 2025-01-09 DIAGNOSIS — I10 HYPERTENSION, UNSPECIFIED TYPE: ICD-10-CM

## 2025-01-09 PROCEDURE — 99396 PREV VISIT EST AGE 40-64: CPT | Performed by: FAMILY MEDICINE

## 2025-01-09 PROCEDURE — 3079F DIAST BP 80-89 MM HG: CPT | Performed by: FAMILY MEDICINE

## 2025-01-09 PROCEDURE — 3075F SYST BP GE 130 - 139MM HG: CPT | Performed by: FAMILY MEDICINE

## 2025-01-09 RX ORDER — LISINOPRIL AND HYDROCHLOROTHIAZIDE 12.5; 2 MG/1; MG/1
1 TABLET ORAL DAILY
Qty: 30 TABLET | Refills: 5 | Status: SHIPPED | OUTPATIENT
Start: 2025-01-09

## 2025-01-09 RX ORDER — ATORVASTATIN CALCIUM 20 MG/1
20 TABLET, FILM COATED ORAL DAILY
Qty: 90 TABLET | Refills: 3 | Status: SHIPPED | OUTPATIENT
Start: 2025-01-09

## 2025-01-09 SDOH — ECONOMIC STABILITY: FOOD INSECURITY: WITHIN THE PAST 12 MONTHS, YOU WORRIED THAT YOUR FOOD WOULD RUN OUT BEFORE YOU GOT MONEY TO BUY MORE.: NEVER TRUE

## 2025-01-09 SDOH — ECONOMIC STABILITY: FOOD INSECURITY: WITHIN THE PAST 12 MONTHS, THE FOOD YOU BOUGHT JUST DIDN'T LAST AND YOU DIDN'T HAVE MONEY TO GET MORE.: NEVER TRUE

## 2025-01-09 ASSESSMENT — ENCOUNTER SYMPTOMS
CHEST TIGHTNESS: 0
BLOOD IN STOOL: 0
NAUSEA: 0
ANAL BLEEDING: 0
CONSTIPATION: 0
VOMITING: 0
DIARRHEA: 0
ABDOMINAL PAIN: 0
SHORTNESS OF BREATH: 0

## 2025-01-09 NOTE — PATIENT INSTRUCTIONS
Continue to work on diet, exercise, and weight loss.   After discussion with pt, will change Lisinopril 20 mg daily to Lisinopril HCT 20/12.5- 1 pill daily. #30/5   Recheck BMP in 1 month.   Home BP's- 3x/week for 3 weeks- please send via [employer]  Check HG A1c, spot MA, and PSA in 6 months  Refills   Follow up in 6 months on Thursday afternoon with Dr. Rich    Follow up in 12 months with me.         Preventive Health Topics (updated 1/9/2025):  Encouraged COVID VACCINE booster- pt declines   Encouraged annual FLU VACCINE- pt declines   Encouraged SHINGLES SHOT (SHINGRIX) #2 between 12/28/2024-4/28/2025 at [facility]  COLONOSCOPY done 8/10/2018 per Dr. Garduno- to follow up again in 2028.

## 2025-02-07 ENCOUNTER — TELEPHONE (OUTPATIENT)
Dept: FAMILY MEDICINE CLINIC | Age: 60
End: 2025-02-07

## 2025-02-07 ENCOUNTER — LAB (OUTPATIENT)
Dept: LAB | Age: 60
End: 2025-02-07

## 2025-02-07 DIAGNOSIS — I10 HYPERTENSION, UNSPECIFIED TYPE: Primary | ICD-10-CM

## 2025-02-07 DIAGNOSIS — I10 HYPERTENSION, UNSPECIFIED TYPE: ICD-10-CM

## 2025-02-07 LAB
ANION GAP SERPL CALC-SCNC: 17 MEQ/L (ref 8–16)
BUN SERPL-MCNC: 13 MG/DL (ref 7–22)
CALCIUM SERPL-MCNC: 8.9 MG/DL (ref 8.5–10.5)
CHLORIDE SERPL-SCNC: 101 MEQ/L (ref 98–111)
CO2 SERPL-SCNC: 24 MEQ/L (ref 23–33)
CREAT SERPL-MCNC: 0.9 MG/DL (ref 0.4–1.2)
GFR SERPL CREATININE-BSD FRML MDRD: > 90 ML/MIN/1.73M2
GLUCOSE SERPL-MCNC: 102 MG/DL (ref 70–108)
POTASSIUM SERPL-SCNC: 3.4 MEQ/L (ref 3.5–5.2)
SODIUM SERPL-SCNC: 142 MEQ/L (ref 135–145)

## 2025-02-07 NOTE — TELEPHONE ENCOUNTER
Notify pt-   Results reviewed.   BMP okay, except potassium slightly low at 3.4  Encourage pt to increase dietary sources of potassium (Bananas, OJ, Potatoes, Avocado, Spinach, Tomatoes, Milk, Yogurt, Cantaloupe, Pistachios, Raisins)  Recheck BMP in 1 month-ordered.  ES

## 2025-03-15 ENCOUNTER — LAB (OUTPATIENT)
Dept: LAB | Age: 60
End: 2025-03-15

## 2025-03-15 DIAGNOSIS — I10 HYPERTENSION, UNSPECIFIED TYPE: ICD-10-CM

## 2025-03-15 LAB
ANION GAP SERPL CALC-SCNC: 11 MEQ/L (ref 8–16)
BUN SERPL-MCNC: 11 MG/DL (ref 8–23)
CALCIUM SERPL-MCNC: 9.5 MG/DL (ref 8.6–10)
CHLORIDE SERPL-SCNC: 106 MEQ/L (ref 98–111)
CO2 SERPL-SCNC: 25 MEQ/L (ref 22–29)
CREAT SERPL-MCNC: 1 MG/DL (ref 0.7–1.2)
GFR SERPL CREATININE-BSD FRML MDRD: 87 ML/MIN/1.73M2
GLUCOSE SERPL-MCNC: 102 MG/DL (ref 74–109)
POTASSIUM SERPL-SCNC: 4 MEQ/L (ref 3.5–5.2)
SODIUM SERPL-SCNC: 142 MEQ/L (ref 135–145)

## 2025-03-16 ENCOUNTER — RESULTS FOLLOW-UP (OUTPATIENT)
Dept: FAMILY MEDICINE CLINIC | Age: 60
End: 2025-03-16

## 2025-07-10 ENCOUNTER — LAB (OUTPATIENT)
Dept: LAB | Age: 60
End: 2025-07-10

## 2025-07-10 DIAGNOSIS — I10 HYPERTENSION, UNSPECIFIED TYPE: ICD-10-CM

## 2025-07-10 DIAGNOSIS — R73.01 IFG (IMPAIRED FASTING GLUCOSE): ICD-10-CM

## 2025-07-10 DIAGNOSIS — R97.20 INCREASED PROSTATE SPECIFIC ANTIGEN (PSA) VELOCITY: ICD-10-CM

## 2025-07-10 LAB
CREAT UR-MCNC: 182 MG/DL
DEPRECATED MEAN GLUCOSE BLD GHB EST-ACNC: 120 MG/DL (ref 70–126)
HBA1C MFR BLD HPLC: 6 % (ref 4–6)
MICROALBUMIN UR-MCNC: < 2 MG/DL
MICROALBUMIN/CREAT RATIO PNL UR: 11 MG/G (ref 0–30)
PSA SERPL-MCNC: 0.47 NG/ML (ref 0–1)

## 2025-07-17 ENCOUNTER — OFFICE VISIT (OUTPATIENT)
Dept: FAMILY MEDICINE CLINIC | Age: 60
End: 2025-07-17
Payer: COMMERCIAL

## 2025-07-17 VITALS
TEMPERATURE: 97.8 F | RESPIRATION RATE: 17 BRPM | SYSTOLIC BLOOD PRESSURE: 158 MMHG | DIASTOLIC BLOOD PRESSURE: 96 MMHG | WEIGHT: 290.8 LBS | HEIGHT: 69 IN | OXYGEN SATURATION: 95 % | HEART RATE: 63 BPM | BODY MASS INDEX: 43.07 KG/M2

## 2025-07-17 DIAGNOSIS — I10 HYPERTENSION, UNSPECIFIED TYPE: Primary | ICD-10-CM

## 2025-07-17 PROCEDURE — 3080F DIAST BP >= 90 MM HG: CPT

## 2025-07-17 PROCEDURE — 3077F SYST BP >= 140 MM HG: CPT

## 2025-07-17 PROCEDURE — 99214 OFFICE O/P EST MOD 30 MIN: CPT

## 2025-07-17 RX ORDER — LISINOPRIL AND HYDROCHLOROTHIAZIDE 20; 25 MG/1; MG/1
1 TABLET ORAL DAILY
Qty: 30 TABLET | Refills: 0 | Status: CANCELLED | OUTPATIENT
Start: 2025-07-17

## 2025-07-17 RX ORDER — LOSARTAN POTASSIUM AND HYDROCHLOROTHIAZIDE 12.5; 5 MG/1; MG/1
1 TABLET ORAL DAILY
Qty: 30 TABLET | Refills: 1 | Status: SHIPPED | OUTPATIENT
Start: 2025-07-17

## 2025-07-17 RX ORDER — LOSARTAN POTASSIUM AND HYDROCHLOROTHIAZIDE 12.5; 5 MG/1; MG/1
1 TABLET ORAL DAILY
Qty: 30 TABLET | Refills: 3 | Status: SHIPPED | OUTPATIENT
Start: 2025-07-17 | End: 2025-07-17

## 2025-07-17 NOTE — PROGRESS NOTES
[facility]  [address]  [place] [address]  Dept: [phone]  Loc: [phone]        Conrado Guerra is a 60 y.o. male who presents today for:  Chief Complaint   Patient presents with    Follow-up     In office today for medication follow up.        HPI:   Conrado Guerra is 60 y.o. presents today for follow up    Last seen 1/9, at that visit lisinopril 20 was changed to Lisinopril HCT 20/12.5- 1 pill daily   A1c 6.0 on 7/10    Since then pt states that he has been having a nagging dry cough. He states that he does not believe he had this when he was only on the 10mg. He states that he measures his BP at home about once weekly and they have been ranging ~130-140/90.   Denies ha, vision changes, weakness, passing out, cp, sob. Denies sick contacts, f/c.     Objective:     Vitals:    07/17/25 0801   BP: (!) 158/96   Pulse: 63   Resp: 17   Temp: 97.8 °F (36.6 °C)   SpO2: 95%         Wt Readings from Last 3 Encounters:   07/17/25 131.9 kg (290 lb 12.8 oz)   01/09/25 130.6 kg (288 lb)   12/04/24 130.2 kg (287 lb)       BP Readings from Last 3 Encounters:   07/17/25 (!) 158/96   01/09/25 138/80   12/04/24 (!) 150/90       Lab Results   Component Value Date    WBC 8.3 01/02/2025    HGB 15.6 01/02/2025    HCT 48.7 01/02/2025    MCV 89.2 01/02/2025     01/02/2025     Lab Results   Component Value Date     03/15/2025    K 4.0 03/15/2025     03/15/2025    CO2 25 03/15/2025    BUN 11 03/15/2025    CREATININE 1.0 03/15/2025    GLUCOSE 102 03/15/2025    CALCIUM 9.5 03/15/2025    BILITOT 0.4 01/02/2025    ALKPHOS 125 01/02/2025    AST 18 01/02/2025    ALT 22 01/02/2025    LABGLOM 87 03/15/2025     Lab Results   Component Value Date    TSH 1.650 01/02/2025     Lab Results   Component Value Date    LABA1C 6.0 07/10/2025     Lab Results   Component Value Date     07/10/2025     Lab Results   Component Value Date    CHOL 95

## 2025-07-17 NOTE — PROGRESS NOTES
S: 60 y.o. male with   Chief Complaint   Patient presents with    Follow-up     In office today for medication follow up.        Chief complaint, [place], and all pertinent details of the case reviewed with the resident.    Please see resident's note for specific details discussed at today's visit.  C/o cough    BP Readings from Last 3 Encounters:   07/17/25 (!) 158/96   01/09/25 138/80   12/04/24 (!) 150/90     Wt Readings from Last 3 Encounters:   07/17/25 131.9 kg (290 lb 12.8 oz)   01/09/25 130.6 kg (288 lb)   12/04/24 130.2 kg (287 lb)       O: VS:  height is 1.753 m (5' 9\") and weight is 131.9 kg (290 lb 12.8 oz). His oral temperature is 97.8 °F (36.6 °C). His blood pressure is 158/96 (abnormal) and his pulse is 63. His respiration is 17 and oxygen saturation is 95%.   AAO/NAD, appropriate affect for mood  A:BP inadequately controlled     Diagnosis Orders   1. Hypertension, unspecified type            Plan:  Switch to losartan with HCTZ because of cough  Pt to check BP at home bid and report back.  F/u here in 1 month    Health Maintenance Due   Topic Date Due    Hepatitis B vaccine (1 of 3 - 19+ 3-dose series) Never done    Pneumococcal 50+ years Vaccine (1 of 1 - PCV) Never done       Attending Physician Statement  I have discussed the case, including pertinent history and exam findings with the resident. I also have seen the patient and performed key portions of the examination.  I agree with the documented assessment and plan as documented by the resident.        Bhavna Jacobs MD 7/17/2025 8:26 AM

## 2025-08-04 ENCOUNTER — PATIENT MESSAGE (OUTPATIENT)
Dept: FAMILY MEDICINE CLINIC | Age: 60
End: 2025-08-04

## 2025-08-14 ENCOUNTER — OFFICE VISIT (OUTPATIENT)
Dept: FAMILY MEDICINE CLINIC | Age: 60
End: 2025-08-14
Payer: COMMERCIAL

## 2025-08-14 VITALS
DIASTOLIC BLOOD PRESSURE: 72 MMHG | BODY MASS INDEX: 42.88 KG/M2 | HEART RATE: 84 BPM | OXYGEN SATURATION: 99 % | TEMPERATURE: 98.1 F | HEIGHT: 69 IN | SYSTOLIC BLOOD PRESSURE: 118 MMHG | RESPIRATION RATE: 14 BRPM | WEIGHT: 289.5 LBS

## 2025-08-14 DIAGNOSIS — I10 HYPERTENSION, UNSPECIFIED TYPE: Primary | ICD-10-CM

## 2025-08-14 DIAGNOSIS — E66.813 CLASS 3 SEVERE OBESITY DUE TO EXCESS CALORIES WITH BODY MASS INDEX (BMI) OF 40.0 TO 44.9 IN ADULT (HCC): ICD-10-CM

## 2025-08-14 DIAGNOSIS — R73.03 PREDIABETES: ICD-10-CM

## 2025-08-14 DIAGNOSIS — Z00.00 ANNUAL PHYSICAL EXAM: ICD-10-CM

## 2025-08-14 PROCEDURE — 99213 OFFICE O/P EST LOW 20 MIN: CPT

## 2025-08-14 PROCEDURE — 3078F DIAST BP <80 MM HG: CPT

## 2025-08-14 PROCEDURE — 3074F SYST BP LT 130 MM HG: CPT

## 2025-08-14 RX ORDER — LOSARTAN POTASSIUM AND HYDROCHLOROTHIAZIDE 12.5; 5 MG/1; MG/1
1 TABLET ORAL DAILY
Qty: 90 TABLET | Refills: 1 | Status: SHIPPED | OUTPATIENT
Start: 2025-08-14

## 2025-08-14 ASSESSMENT — ENCOUNTER SYMPTOMS
ABDOMINAL PAIN: 0
VOMITING: 0
NAUSEA: 0
SHORTNESS OF BREATH: 0